# Patient Record
Sex: FEMALE | Race: WHITE | NOT HISPANIC OR LATINO | Employment: UNEMPLOYED | URBAN - METROPOLITAN AREA
[De-identification: names, ages, dates, MRNs, and addresses within clinical notes are randomized per-mention and may not be internally consistent; named-entity substitution may affect disease eponyms.]

---
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
## 2019-01-08 ENCOUNTER — POSTPARTUM VISIT (OUTPATIENT)
Dept: OBSTETRICS AND GYNECOLOGY | Facility: CLINIC | Age: 19
End: 2019-01-08

## 2019-01-08 VITALS — SYSTOLIC BLOOD PRESSURE: 122 MMHG | BODY MASS INDEX: 26.26 KG/M2 | WEIGHT: 130 LBS | DIASTOLIC BLOOD PRESSURE: 60 MMHG

## 2019-01-08 PROCEDURE — 99213 OFFICE O/P EST LOW 20 MIN: CPT | Performed by: OBSTETRICS & GYNECOLOGY

## 2019-01-08 RX ORDER — NORGESTIMATE AND ETHINYL ESTRADIOL 0.25-0.035
1 KIT ORAL DAILY
Qty: 1 PACKAGE | Refills: 11 | Status: SHIPPED | OUTPATIENT
Start: 2019-01-08 | End: 2019-02-05

## 2019-01-08 NOTE — PROGRESS NOTES
Cindy Guerin is a 18 y.o. patient who presents for follow up of   Chief Complaint   Patient presents with   • Postpartum Care     2 week       HPI  8 days postop status post  for twins.  Patient reports no fevers.  Appropriate abdominal pain.  Wants birth control.    The following portions of the patient's history were reviewed and updated as appropriate: allergies, current medications and problem list.    Review of Systems   Constitutional: Negative for appetite change, fever and unexpected weight change.   HENT: Negative for congestion and sore throat.    Respiratory: Negative for cough and shortness of breath.    Cardiovascular: Negative for chest pain and palpitations.   Gastrointestinal: Negative for abdominal distention, abdominal pain, constipation, diarrhea, nausea and vomiting.   Endocrine: Negative.    Genitourinary: Negative for dyspareunia, menstrual problem, pelvic pain and vaginal discharge.   Skin: Negative.    Neurological: Negative for dizziness and syncope.   Hematological: Negative.    Psychiatric/Behavioral: Negative for dysphoric mood and sleep disturbance. The patient is not nervous/anxious.        /60   Wt 59 kg (130 lb)   LMP  (LMP Unknown)   Breastfeeding? No   BMI 26.26 kg/m²     Physical Exam   Constitutional: She is oriented to person, place, and time. She appears well-developed and well-nourished.   HENT:   Head: Normocephalic and atraumatic.   Pulmonary/Chest: Effort normal. No respiratory distress.   Abdominal: Soft. She exhibits no distension and no mass. There is no tenderness. There is no rebound and no guarding.   Musculoskeletal: Normal range of motion.   Neurological: She is alert and oriented to person, place, and time.   Skin: Skin is warm and dry.   Psychiatric: She has a normal mood and affect. Her behavior is normal. Judgment and thought content normal.   Nursing note and vitals reviewed.        Assessment/Plan    Cindy was seen today for  postpartum care.    Diagnoses and all orders for this visit:    Postpartum care and examination    Other orders  -     norgestimate-ethinyl estradiol (ORTHO-CYCLEN) 0.25-35 MG-MCG per tablet; Take 1 tablet by mouth Daily.    Wants Nexplanon, start OCPs until that comes in.    Return in about 2 weeks (around 1/22/2019) for Nexplanon insertion.      Jarred Mcdaniel MD  1/8/2019  5:36 PM

## 2019-01-22 ENCOUNTER — PROCEDURE VISIT (OUTPATIENT)
Dept: OBSTETRICS AND GYNECOLOGY | Facility: CLINIC | Age: 19
End: 2019-01-22

## 2019-01-22 VITALS
WEIGHT: 129 LBS | SYSTOLIC BLOOD PRESSURE: 120 MMHG | BODY MASS INDEX: 26 KG/M2 | HEIGHT: 59 IN | DIASTOLIC BLOOD PRESSURE: 80 MMHG

## 2019-01-22 DIAGNOSIS — B18.1 CHRONIC VIRAL HEPATITIS B WITHOUT DELTA AGENT AND WITHOUT COMA (HCC): ICD-10-CM

## 2019-01-22 DIAGNOSIS — B18.2 CHRONIC HEPATITIS C WITHOUT HEPATIC COMA (HCC): ICD-10-CM

## 2019-01-22 DIAGNOSIS — K43.9 HERNIA OF ABDOMINAL WALL: ICD-10-CM

## 2019-01-22 DIAGNOSIS — F17.200 SMOKER: ICD-10-CM

## 2019-01-22 DIAGNOSIS — Z30.017 NEXPLANON INSERTION: Primary | ICD-10-CM

## 2019-01-22 PROBLEM — B95.1 GROUP B STREPTOCOCCUS URINARY TRACT INFECTION AFFECTING PREGNANCY, ANTEPARTUM: Status: RESOLVED | Noted: 2018-12-20 | Resolved: 2019-01-22

## 2019-01-22 PROBLEM — O24.419 GESTATIONAL DIABETES MELLITUS (GDM) AFFECTING PREGNANCY: Status: RESOLVED | Noted: 2018-11-07 | Resolved: 2019-01-22

## 2019-01-22 PROBLEM — O09.892 SHORT INTERVAL BETWEEN PREGNANCIES AFFECTING PREGNANCY IN SECOND TRIMESTER, ANTEPARTUM: Status: RESOLVED | Noted: 2018-10-01 | Resolved: 2019-01-22

## 2019-01-22 PROBLEM — O23.40 GROUP B STREPTOCOCCUS URINARY TRACT INFECTION AFFECTING PREGNANCY, ANTEPARTUM: Status: RESOLVED | Noted: 2018-12-20 | Resolved: 2019-01-22

## 2019-01-22 PROBLEM — R94.31 ABNORMAL EKG: Status: RESOLVED | Noted: 2018-06-12 | Resolved: 2019-01-22

## 2019-01-22 PROBLEM — O30.043 DICHORIONIC DIAMNIOTIC TWIN PREGNANCY IN THIRD TRIMESTER: Status: RESOLVED | Noted: 2018-08-14 | Resolved: 2019-01-22

## 2019-01-22 PROCEDURE — 11981 INSERTION DRUG DLVR IMPLANT: CPT | Performed by: OBSTETRICS & GYNECOLOGY

## 2019-01-22 NOTE — PROGRESS NOTES
Nexplanon Insertion:  Chief Complaint   Patient presents with   • Contraception     nexplanon insertion   • Procedure       Pre Dx: 1) Nexplanon Insertion   Post Dx: 1) Nexplanon Insertion     Procedures    Risks, benefits, alternatives explained. All questions answered. Consents signed.  Patient placed on examined table. Nexplanon to be inserted into nondominant arm. The area for insertion prepped with betadine in a sterile fashion. About 1 mL of 1% lidocaine was infiltrated without incident.    The insertion site was identified approximately 6-8 cm proximal to the elbow crease in the underside of the upper left arm overlying the groove between the biceps and triceps muscles. The skin at the insertion site was stretched by my thumb and index finger. Then inserted the needle tip, bevel side up, at an angle not greater than 20° to the skin surface, just until the skin was penetrated. The applicator was then lowered so that it was parallel to the arm. To minimize the chance of deep incision, the skin was tented upwards with the tip of the needle. The needle was then gently inserted to the full length. Then fixed the device in place on the arm with one hand. I then slowly and carefully retracted the needle cannula back along the length of the device after pushing the release button. The obturator was seen in the device to determine that the nexplanon had been released.     Both the patient and I were easily able to feel the device under the skin. Steri-Strips were applied at the site, and the arm was gently wrapped with gauze.    There were no complications.   The patient tolerated the procedure well.     She was given a reminder card. She was advised to use condoms as a backup method for at least a week after insertion.    Expectations, warning signs and limitations reviewed.     Cindy was seen today for contraception and procedure.    Diagnoses and all orders for this visit:    Smoker    Hernia of abdominal wall  -      Ambulatory Referral to General Surgery    Chronic viral hepatitis B    Chronic hepatitis C without hepatic coma (CMS/HCC)    Nexplanon insertion    surgery consult    RTO Return if symptoms worsen or fail to improve.      Gentry Craig MD    1/22/2019  3:37 PM

## 2019-02-05 ENCOUNTER — POSTPARTUM VISIT (OUTPATIENT)
Dept: OBSTETRICS AND GYNECOLOGY | Facility: CLINIC | Age: 19
End: 2019-02-05

## 2019-02-05 VITALS
DIASTOLIC BLOOD PRESSURE: 70 MMHG | WEIGHT: 127 LBS | BODY MASS INDEX: 26.66 KG/M2 | SYSTOLIC BLOOD PRESSURE: 110 MMHG | HEIGHT: 58 IN

## 2019-02-05 DIAGNOSIS — K43.9 HERNIA OF ABDOMINAL WALL: ICD-10-CM

## 2019-02-05 DIAGNOSIS — B18.1 CHRONIC VIRAL HEPATITIS B WITHOUT DELTA AGENT AND WITHOUT COMA (HCC): ICD-10-CM

## 2019-02-05 DIAGNOSIS — Z13.9 SCREENING FOR CONDITION: Primary | ICD-10-CM

## 2019-02-05 LAB
B-HCG UR QL: NEGATIVE
BILIRUB BLD-MCNC: NEGATIVE MG/DL
CLARITY, POC: CLEAR
COLOR UR: YELLOW
GLUCOSE UR STRIP-MCNC: NEGATIVE MG/DL
INTERNAL NEGATIVE CONTROL: NEGATIVE
INTERNAL POSITIVE CONTROL: POSITIVE
KETONES UR QL: NEGATIVE
LEUKOCYTE EST, POC: NEGATIVE
Lab: 2154
NITRITE UR-MCNC: NEGATIVE MG/ML
PH UR: 5 [PH] (ref 5–8)
PROT UR STRIP-MCNC: NEGATIVE MG/DL
RBC # UR STRIP: NEGATIVE /UL
SP GR UR: 1 (ref 1–1.03)
UROBILINOGEN UR QL: NORMAL

## 2019-02-05 PROCEDURE — 81025 URINE PREGNANCY TEST: CPT | Performed by: OBSTETRICS & GYNECOLOGY

## 2019-02-05 PROCEDURE — 99213 OFFICE O/P EST LOW 20 MIN: CPT | Performed by: OBSTETRICS & GYNECOLOGY

## 2019-02-05 RX ORDER — LEVETIRACETAM 500 MG/1
2000 TABLET ORAL 2 TIMES DAILY
COMMUNITY
Start: 2019-01-22 | End: 2019-04-22 | Stop reason: SDUPTHER

## 2019-02-05 RX ORDER — ETONOGESTREL 68 MG/1
1 IMPLANT SUBCUTANEOUS
COMMUNITY
Start: 2019-01-28 | End: 2021-06-11

## 2019-02-05 RX ORDER — AMOXICILLIN AND CLAVULANATE POTASSIUM 875; 125 MG/1; MG/1
TABLET, FILM COATED ORAL
COMMUNITY
Start: 2019-01-25 | End: 2019-02-20

## 2019-02-06 ENCOUNTER — OFFICE VISIT (OUTPATIENT)
Dept: SURGERY | Facility: CLINIC | Age: 19
End: 2019-02-06

## 2019-02-06 VITALS
WEIGHT: 130 LBS | DIASTOLIC BLOOD PRESSURE: 60 MMHG | HEIGHT: 58 IN | BODY MASS INDEX: 27.29 KG/M2 | RESPIRATION RATE: 18 BRPM | SYSTOLIC BLOOD PRESSURE: 118 MMHG

## 2019-02-06 DIAGNOSIS — M79.89 SOFT TISSUE MASS: Primary | ICD-10-CM

## 2019-02-06 PROCEDURE — 99213 OFFICE O/P EST LOW 20 MIN: CPT | Performed by: SURGERY

## 2019-02-06 NOTE — PROGRESS NOTES
"Cindy Guerin is a 18 y.o. patient who presents for follow up of   Chief Complaint   Patient presents with   • Postpartum Care       HPI 18-year-old 6 weeks status post delivery of twins at 35 weeks.  She is a chronic hepatitis B carrier and needs follow-up.  She does not report any vaginal bleeding at this time.  She has had a subdermal implant for birth control already placed.  She denies any postpartum blues and the babies are doing well at home.  She has surgery scheduled on her left ventral wall hernia scheduled for next week.  She wants to have a note to be cleared for work.  She is currently seeking employment.    The following portions of the patient's history were reviewed and updated as appropriate: allergies, current medications and problem list.    Review of Systems   Constitutional: Negative for appetite change, fever and unexpected weight change.   HENT: Negative for congestion and sore throat.    Respiratory: Negative for cough and shortness of breath.    Cardiovascular: Negative for chest pain and palpitations.   Gastrointestinal: Negative for abdominal distention, abdominal pain, constipation, diarrhea, nausea and vomiting.   Endocrine: Negative.    Genitourinary: Negative for dyspareunia, menstrual problem, pelvic pain and vaginal discharge.   Skin: Negative.    Neurological: Negative for dizziness and syncope.   Hematological: Negative.    Psychiatric/Behavioral: Negative for dysphoric mood and sleep disturbance. The patient is not nervous/anxious.        /70   Ht 147.3 cm (58\")   Wt 57.6 kg (127 lb)   LMP  (LMP Unknown)   Breastfeeding? No   BMI 26.54 kg/m²     Physical Exam   Constitutional: She is oriented to person, place, and time. She appears well-developed and well-nourished.   HENT:   Head: Normocephalic and atraumatic.   Pulmonary/Chest: Effort normal. No respiratory distress.   Abdominal: Soft. She exhibits no distension (left rectus hernia) and no mass. There is no " tenderness. There is no rebound and no guarding.   Musculoskeletal: Normal range of motion.   Neurological: She is alert and oriented to person, place, and time.   Skin: Skin is warm and dry.   Psychiatric: She has a normal mood and affect. Her behavior is normal. Judgment and thought content normal.   Nursing note and vitals reviewed.        Assessment/Plan    Cindy was seen today for postpartum care.    Diagnoses and all orders for this visit:    Screening for condition  -     POC Urinalysis Dipstick  -     POC Pregnancy, Urine    Postpartum care and examination    Hernia of abdominal wall    Chronic viral hepatitis B    No Pap indicated since 18 years old.  Has surgery planned for ventral hernia.  Has subdermal implant for birth control.  Follow up one year for annual physical.    Return in about 1 year (around 2/5/2020) for Annual physical.      Jarred Mcdaniel MD  2/6/2019  9:06 AM

## 2019-02-06 NOTE — PROGRESS NOTES
PATIENT INFORMATION  Cindy Guerin       - 2000    CHIEF COMPLAINT  Chief Complaint   Patient presents with   • Hernia   hernia x 1 month on the left associated with pain    HISTORY OF PRESENT ILLNESS  HPI she complains of a one month history of a knot of her left abdominal wall that she says is painful and has increased in size.  She denies any GI complaints.  She is not having her periods.  She had a recent .        REVIEW OF SYSTEMS  Review of Systems   Gastrointestinal: Positive for constipation.         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]   • Drug abuse during pregnancy (CMS/HCC) [O99.320, F19.10]   • Chronic hepatitis C without hepatic coma (CMS/HCC) [B18.2]   • Chronic viral hepatitis B [B18.1]         PAST MEDICAL HISTORY  Past Medical History:   Diagnosis Date   • Anemia    • Gallstones     sched lap elyssa   • Hepatitis B    • Hepatitis C    • History of heart murmur in childhood    • History of kidney stones    • HSV-1 infection 2017   • Seizures (CMS/HCC)     last 18, also had one on 18, was seen at [facility]; has been referred to neuro by PCP         SURGICAL HISTORY  Past Surgical History:   Procedure Laterality Date   • ADENOIDECTOMY     •  SECTION      Twins 36 weeks   • TONSILLECTOMY     • WISDOM TOOTH EXTRACTION           FAMILY HISTORY  Family History   Problem Relation Age of Onset   • Cervical cancer Mother    • Cervical cancer Maternal Grandmother    • Diabetes Maternal Grandmother    • Brain cancer Other    • Diabetes Other    • Heart failure Other    • Diabetes Other    • Heart  "failure Other    • Other Sister         SAB   • Breast cancer Neg Hx    • Ovarian cancer Neg Hx    • Colon cancer Neg Hx          SOCIAL HISTORY  Social History     Occupational History     Employer: UNEMPLOYED   Tobacco Use   • Smoking status: Current Every Day Smoker     Packs/day: 0.50     Years: 10.00     Pack years: 5.00     Types: Cigarettes   • Smokeless tobacco: Never Used   Substance and Sexual Activity   • Alcohol use: No   • Drug use: Yes     Types: Methamphetamines     Comment: history of drug use- last use 8 monthsago   • Sexual activity: Yes     Partners: Male     Birth control/protection: None, Implant       Debilities/Disabilities Identified: None    Emotional Behavior: Appropriate    CURRENT MEDICATIONS    Current Outpatient Medications:   •  amoxicillin-clavulanate (AUGMENTIN) 875-125 MG per tablet, , Disp: , Rfl:   •  levETIRAcetam (KEPPRA) 500 MG tablet, , Disp: , Rfl:   •  NEXPLANON 68 MG implant subdermal implant, , Disp: , Rfl:     ALLERGIES  Sulfa antibiotics    VITALS  Vitals:    02/06/19 1403   BP: 118/60   Resp: 18   Weight: 59 kg (130 lb)   Height: 147.3 cm (58\")       LAST RESULTS   Postpartum Visit on 02/05/2019   Component Date Value Ref Range Status   • Color 02/05/2019 Yellow  Yellow, Straw, Dark Yellow, Gregoria Final   • Clarity, UA 02/05/2019 Clear  Clear Final   • Glucose, UA 02/05/2019 Negative  Negative, 1000 mg/dL (3+) mg/dL Final   • Bilirubin 02/05/2019 Negative  Negative Final   • Ketones, UA 02/05/2019 Negative  Negative Final   • Specific Gravity  02/05/2019 1.005  1.005 - 1.030 Final   • Blood, UA 02/05/2019 Negative  Negative Final   • pH, Urine 02/05/2019 5.0  5.0 - 8.0 Final   • Protein, POC 02/05/2019 Negative  Negative mg/dL Final   • Urobilinogen, UA 02/05/2019 Normal  Normal Final   • Leukocytes 02/05/2019 Negative  Negative Final   • Nitrite, UA 02/05/2019 Negative  Negative Final   • HCG, Urine, QL 02/05/2019 Negative  Negative Final   • Lot Number 02/05/2019 [id number] "   Final   • Internal Positive Control 02/05/2019 Positive   Final   • Internal Negative Control 02/05/2019 Negative   Final     No results found.    PHYSICAL EXAM  Physical Exam's alert white female in no active distress.  She points to a nodule in her left lower quadrant.  There is no cellulitis or erythema.  There is no evidence of abscess.  There is a small thickening in her soft tissue in this area but I was unable to appreciate a discrete mass nor an impulse.  We will check a CT scan of the abdomen to rule out a spighellian hernia  She also has known cholelithiasis on ultrasound showed a gallstone.  She apparently was going to have surgery with another surgeon but that got deferred  ASSESSMENT  Abdominal wall mass      PLAN  See Her back after CT scan of the abdomen is performed to rule out a spighellian hernia

## 2019-02-11 ENCOUNTER — HOSPITAL ENCOUNTER (OUTPATIENT)
Dept: CT IMAGING | Facility: HOSPITAL | Age: 19
Discharge: HOME | End: 2019-02-11
Admitting: SURGERY

## 2019-02-11 DIAGNOSIS — M79.89 SOFT TISSUE MASS: ICD-10-CM

## 2019-02-11 PROCEDURE — 74176 CT ABD & PELVIS W/O CONTRAST: CPT

## 2019-02-13 ENCOUNTER — OFFICE VISIT (OUTPATIENT)
Dept: SURGERY | Facility: CLINIC | Age: 19
End: 2019-02-13

## 2019-02-13 VITALS
SYSTOLIC BLOOD PRESSURE: 120 MMHG | DIASTOLIC BLOOD PRESSURE: 64 MMHG | HEIGHT: 58 IN | BODY MASS INDEX: 27.92 KG/M2 | RESPIRATION RATE: 18 BRPM | WEIGHT: 133 LBS

## 2019-02-13 DIAGNOSIS — K43.9 VENTRAL HERNIA WITHOUT OBSTRUCTION OR GANGRENE: Primary | ICD-10-CM

## 2019-02-13 PROCEDURE — 99213 OFFICE O/P EST LOW 20 MIN: CPT | Performed by: SURGERY

## 2019-02-13 NOTE — PROGRESS NOTES
PATIENT INFORMATION  Cindy Guerin       - 2000    CHIEF COMPLAINT  Chief Complaint   Patient presents with   • Abdominal Pain   abdominal pain has increased since last visit. Ct completed 19 - at times she has nausea and vomiting    HISTORY OF PRESENT ILLNESS  HPI she is here today for follow-up on her CT scan.  She still complains of ongoing pain in the left lower quadrant.  He does have some nausea and periumbilical pain as well.        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]   • Drug abuse during pregnancy (CMS/HCC) [O99.320, F19.10]   • Chronic hepatitis C without hepatic coma (CMS/HCC) [B18.2]   • Chronic viral hepatitis B [B18.1]         PAST MEDICAL HISTORY  Past Medical History:   Diagnosis Date   • Anemia    • Gallstones     sched lap elyssa   • Hepatitis B    • Hepatitis C    • History of heart murmur in childhood    • History of kidney stones    • HSV-1 infection 2017   • Seizures (CMS/HCC)     last 18, also had one on 18, was seen at [facility]; has been referred to neuro by PCP         SURGICAL HISTORY  Past Surgical History:   Procedure Laterality Date   • ADENOIDECTOMY     •  SECTION      Twins 36 weeks   • TONSILLECTOMY     • WISDOM TOOTH EXTRACTION           FAMILY HISTORY  Family History   Problem Relation Age of Onset   • Cervical cancer Mother    • Cervical cancer Maternal Grandmother    • Diabetes Maternal Grandmother    • Brain cancer Other    • Diabetes Other    • Heart failure Other    • Diabetes Other    • Heart failure Other   "  • Other Sister         SAB   • Breast cancer Neg Hx    • Ovarian cancer Neg Hx    • Colon cancer Neg Hx          SOCIAL HISTORY  Social History     Occupational History     Employer: UNEMPLOYED   Tobacco Use   • Smoking status: Current Every Day Smoker     Packs/day: 0.50     Years: 10.00     Pack years: 5.00     Types: Cigarettes   • Smokeless tobacco: Never Used   Substance and Sexual Activity   • Alcohol use: No   • Drug use: Yes     Types: Methamphetamines     Comment: history of drug use- last use 8 monthsago   • Sexual activity: Yes     Partners: Male     Birth control/protection: None, Implant       Debilities/Disabilities Identified: None    Emotional Behavior: Appropriate    CURRENT MEDICATIONS    Current Outpatient Medications:   •  amoxicillin-clavulanate (AUGMENTIN) 875-125 MG per tablet, , Disp: , Rfl:   •  levETIRAcetam (KEPPRA) 500 MG tablet, , Disp: , Rfl:   •  NEXPLANON 68 MG implant subdermal implant, , Disp: , Rfl:     ALLERGIES  Sulfa antibiotics    VITALS  Vitals:    02/13/19 1441   BP: 120/64   Resp: 18   Weight: 60.3 kg (133 lb)   Height: 147.3 cm (58\")       LAST RESULTS   Postpartum Visit on 02/05/2019   Component Date Value Ref Range Status   • Color 02/05/2019 Yellow  Yellow, Straw, Dark Yellow, Gregoria Final   • Clarity, UA 02/05/2019 Clear  Clear Final   • Glucose, UA 02/05/2019 Negative  Negative, 1000 mg/dL (3+) mg/dL Final   • Bilirubin 02/05/2019 Negative  Negative Final   • Ketones, UA 02/05/2019 Negative  Negative Final   • Specific Gravity  02/05/2019 1.005  1.005 - 1.030 Final   • Blood, UA 02/05/2019 Negative  Negative Final   • pH, Urine 02/05/2019 5.0  5.0 - 8.0 Final   • Protein, POC 02/05/2019 Negative  Negative mg/dL Final   • Urobilinogen, UA 02/05/2019 Normal  Normal Final   • Leukocytes 02/05/2019 Negative  Negative Final   • Nitrite, UA 02/05/2019 Negative  Negative Final   • HCG, Urine, QL 02/05/2019 Negative  Negative Final   • Lot Number 02/05/2019 [id number]   Final   • " Internal Positive Control 02/05/2019 Positive   Final   • Internal Negative Control 02/05/2019 Negative   Final     Ct Abdomen Pelvis Without Contrast    Addendum Date: 2/11/2019 Addendum:   In the left lower quadrant of the anterior abdominal wall located just below the area of marked dilated abnormality and just above the left inguinal region, there is a 2 cm x 1 cm circumscribed focus of fat tissue which could reflect a small hernia. Clinical correlation is recommended. Findings discussed with Dr. Alvarado at 2:45 PM on 2/11/2019.  This report was finalized on 2/11/2019 2:49 PM by Dr. Anish Watson MD.      Result Date: 2/11/2019  Narrative: CT SCAN OF THE ABDOMEN AND PELVIS WITHOUT CONTRAST 2/11/2019  HISTORY: Pain in mid abdomen with constipation since December 2018  TECHNIQUE: Spiral CT was performed through the abdomen and pelvis without oral or intravenous contrast administration as per clinician request. Radiation dose reduction techniques included automated exposure control or exposure modulation based on body size. Radiation audit for CT and nuclear cardiology exams in the last 12 months: 0.  ABDOMEN FINDINGS: Examination is somewhat limited by the lack of oral and intravenous contrast. The liver, spleen, pancreas, gallbladder and biliary tree and adrenal glands are normal. There are nonobstructing renal stones bilaterally.  PELVIS FINDINGS: The gut, mesenteric and jose structures are normal. The appendix is normal. There is a small perifocal hernia containing only fat. There is no free fluid in the abdomen or pelvis. Lung bases are normal.      Impression: 1. Nonobstructing renal stones bilaterally. 2. Small periumbilical hernia containing only fat.  This report was finalized on 2/11/2019 2:00 PM by Dr. Anish Watson MD.        PHYSICAL EXAM  Physical Exam alert white female in no active distress.  Her heart shows a regular rate and rhythm her lungs are clear and equal.  Her gait is normal.  She has a  reducible periumbilical hernia.  She has had a mass in the left lower quadrant.  CT scan of the abdomen was reviewed by me and does show cholelithiasis and a periumbilical hernia.  It also shows what appears to be a hernia sac in the left lower quadrant at the site of her concern.    ASSESSMENT  Ventral hernia x2, cholelithiasis      PLAN  I discussed the risk benefits and options with her mother in detail.  I would like to stage these procedures.  I would like to proceed with exploration of her left lower quadrant abdominal wall first because that is what she is primarily symptomatic from.  This may require a mesh repair so I would like to fall off on proceeding with a gallbladder surgery until later date.  Discussed risks of surgery with patient and in particular increased risk of wound infection, poor wound healing, hernias (with abdominal surgery) and post-operative pulmonary complications associated with smoking.

## 2019-02-13 NOTE — H&P
PATIENT INFORMATION  Cindy Guerin       - 2000    CHIEF COMPLAINT  Chief Complaint   Patient presents with   • Abdominal Pain   abdominal pain has increased since last visit. Ct completed 19 - at times she has nausea and vomiting    HISTORY OF PRESENT ILLNESS  HPI she is here today for follow-up on her CT scan.  She still complains of ongoing pain in the left lower quadrant.  He does have some nausea and periumbilical pain as well.        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]   • Drug abuse during pregnancy (CMS/HCC) [O99.320, F19.10]   • Chronic hepatitis C without hepatic coma (CMS/HCC) [B18.2]   • Chronic viral hepatitis B [B18.1]         PAST MEDICAL HISTORY  Past Medical History:   Diagnosis Date   • Anemia    • Gallstones     sched lap elyssa   • Hepatitis B    • Hepatitis C    • History of heart murmur in childhood    • History of kidney stones    • HSV-1 infection 2017   • Seizures (CMS/HCC)     last 18, also had one on 18, was seen at [facility]; has been referred to neuro by PCP         SURGICAL HISTORY  Past Surgical History:   Procedure Laterality Date   • ADENOIDECTOMY     •  SECTION      Twins 36 weeks   • TONSILLECTOMY     • WISDOM TOOTH EXTRACTION           FAMILY HISTORY  Family History   Problem Relation Age of Onset   • Cervical cancer Mother    • Cervical cancer Maternal Grandmother    • Diabetes Maternal Grandmother    • Brain cancer Other    • Diabetes Other    • Heart failure Other    • Diabetes Other    • Heart failure Other   "  • Other Sister         SAB   • Breast cancer Neg Hx    • Ovarian cancer Neg Hx    • Colon cancer Neg Hx          SOCIAL HISTORY  Social History     Occupational History     Employer: UNEMPLOYED   Tobacco Use   • Smoking status: Current Every Day Smoker     Packs/day: 0.50     Years: 10.00     Pack years: 5.00     Types: Cigarettes   • Smokeless tobacco: Never Used   Substance and Sexual Activity   • Alcohol use: No   • Drug use: Yes     Types: Methamphetamines     Comment: history of drug use- last use 8 monthsago   • Sexual activity: Yes     Partners: Male     Birth control/protection: None, Implant       Debilities/Disabilities Identified: None    Emotional Behavior: Appropriate    CURRENT MEDICATIONS    Current Outpatient Medications:   •  amoxicillin-clavulanate (AUGMENTIN) 875-125 MG per tablet, , Disp: , Rfl:   •  levETIRAcetam (KEPPRA) 500 MG tablet, , Disp: , Rfl:   •  NEXPLANON 68 MG implant subdermal implant, , Disp: , Rfl:     ALLERGIES  Sulfa antibiotics    VITALS  Vitals:    02/13/19 1441   BP: 120/64   Resp: 18   Weight: 60.3 kg (133 lb)   Height: 147.3 cm (58\")       LAST RESULTS   Postpartum Visit on 02/05/2019   Component Date Value Ref Range Status   • Color 02/05/2019 Yellow  Yellow, Straw, Dark Yellow, Gregoria Final   • Clarity, UA 02/05/2019 Clear  Clear Final   • Glucose, UA 02/05/2019 Negative  Negative, 1000 mg/dL (3+) mg/dL Final   • Bilirubin 02/05/2019 Negative  Negative Final   • Ketones, UA 02/05/2019 Negative  Negative Final   • Specific Gravity  02/05/2019 1.005  1.005 - 1.030 Final   • Blood, UA 02/05/2019 Negative  Negative Final   • pH, Urine 02/05/2019 5.0  5.0 - 8.0 Final   • Protein, POC 02/05/2019 Negative  Negative mg/dL Final   • Urobilinogen, UA 02/05/2019 Normal  Normal Final   • Leukocytes 02/05/2019 Negative  Negative Final   • Nitrite, UA 02/05/2019 Negative  Negative Final   • HCG, Urine, QL 02/05/2019 Negative  Negative Final   • Lot Number 02/05/2019 [id number]   Final   • " Internal Positive Control 02/05/2019 Positive   Final   • Internal Negative Control 02/05/2019 Negative   Final     Ct Abdomen Pelvis Without Contrast    Addendum Date: 2/11/2019 Addendum:   In the left lower quadrant of the anterior abdominal wall located just below the area of marked dilated abnormality and just above the left inguinal region, there is a 2 cm x 1 cm circumscribed focus of fat tissue which could reflect a small hernia. Clinical correlation is recommended. Findings discussed with Dr. Alvarado at 2:45 PM on 2/11/2019.  This report was finalized on 2/11/2019 2:49 PM by Dr. Anish Watson MD.      Result Date: 2/11/2019  Narrative: CT SCAN OF THE ABDOMEN AND PELVIS WITHOUT CONTRAST 2/11/2019  HISTORY: Pain in mid abdomen with constipation since December 2018  TECHNIQUE: Spiral CT was performed through the abdomen and pelvis without oral or intravenous contrast administration as per clinician request. Radiation dose reduction techniques included automated exposure control or exposure modulation based on body size. Radiation audit for CT and nuclear cardiology exams in the last 12 months: 0.  ABDOMEN FINDINGS: Examination is somewhat limited by the lack of oral and intravenous contrast. The liver, spleen, pancreas, gallbladder and biliary tree and adrenal glands are normal. There are nonobstructing renal stones bilaterally.  PELVIS FINDINGS: The gut, mesenteric and jose structures are normal. The appendix is normal. There is a small perifocal hernia containing only fat. There is no free fluid in the abdomen or pelvis. Lung bases are normal.      Impression: 1. Nonobstructing renal stones bilaterally. 2. Small periumbilical hernia containing only fat.  This report was finalized on 2/11/2019 2:00 PM by Dr. Anish Watson MD.        PHYSICAL EXAM  Physical Exam alert white female in no active distress.  Her heart shows a regular rate and rhythm her lungs are clear and equal.  Her gait is normal.  She has a  reducible periumbilical hernia.  She has had a mass in the left lower quadrant.  CT scan of the abdomen was reviewed by me and does show cholelithiasis and a periumbilical hernia.  It also shows what appears to be a hernia sac in the left lower quadrant at the site of her concern.    ASSESSMENT  Ventral hernia x2, cholelithiasis      PLAN  I discussed the risk benefits and options with her mother in detail.  I would like to stage these procedures.  I would like to proceed with exploration of her left lower quadrant abdominal wall first because that is what she is primarily symptomatic from.  This may require a mesh repair so I would like to fall off on proceeding with a gallbladder surgery until later date.  Discussed risks of surgery with patient and in particular increased risk of wound infection, poor wound healing, hernias (with abdominal surgery) and post-operative pulmonary complications associated with smoking.

## 2019-02-14 ENCOUNTER — TELEPHONE (OUTPATIENT)
Dept: SURGERY | Facility: CLINIC | Age: 19
End: 2019-02-14

## 2019-02-14 ENCOUNTER — ANESTHESIA EVENT (OUTPATIENT)
Dept: PERIOP | Facility: HOSPITAL | Age: 19
End: 2019-02-14

## 2019-02-14 ENCOUNTER — HOSPITAL ENCOUNTER (OUTPATIENT)
Facility: HOSPITAL | Age: 19
Setting detail: HOSPITAL OUTPATIENT SURGERY
End: 2019-02-14
Attending: SURGERY | Admitting: SURGERY

## 2019-02-14 DIAGNOSIS — Z01.818 PREOPERATIVE CLEARANCE: Primary | ICD-10-CM

## 2019-02-14 PROBLEM — K43.9 VENTRAL HERNIA WITHOUT OBSTRUCTION OR GANGRENE: Status: ACTIVE | Noted: 2019-02-14

## 2019-02-14 NOTE — PAT
Chart reviewed by MD SETH; stated pt needs to have echo and f/u with Dr Berry as previously ordered in June of 2018 prior to surgery.  Notified  at Dr Alvarado's office.

## 2019-02-14 NOTE — TELEPHONE ENCOUNTER
Anesthesia is requesting cardiac clearance prior to her hernia repair scheduled on 2/22/19. Patient has scheduled her echo on 2/21/19 @ 2:00. Did you need to see her back in the office for clearance? Would it even be possible to have the results back from the echo prior to her scheduled surgery?

## 2019-02-14 NOTE — TELEPHONE ENCOUNTER
Patient is aware to contact one call to schedule her Echo. I will send a message to her cardiologist for clearance after this is done.

## 2019-02-15 NOTE — TELEPHONE ENCOUNTER
Pt will need to have stress test and follow up with Dr Berry.     We scheduled stress test on same day as Echo and office visit on 2/26/19 at 12:30.  Stress Test at 1:00p  Echo 2:00p

## 2019-02-15 NOTE — TELEPHONE ENCOUNTER
Surgery has been canceled on 2/22/19. Patient is aware to call me back to schedule surgery after clearance. I also advised her to keep her appointment with the neurologist.

## 2019-02-20 ENCOUNTER — HOSPITAL ENCOUNTER (EMERGENCY)
Facility: HOSPITAL | Age: 19
Discharge: HOME | End: 2019-02-20
Attending: EMERGENCY MEDICINE | Admitting: EMERGENCY MEDICINE

## 2019-02-20 ENCOUNTER — TELEPHONE (OUTPATIENT)
Dept: SURGERY | Facility: CLINIC | Age: 19
End: 2019-02-20

## 2019-02-20 VITALS
TEMPERATURE: 98.2 F | BODY MASS INDEX: 26.81 KG/M2 | OXYGEN SATURATION: 99 % | HEART RATE: 70 BPM | HEIGHT: 59 IN | DIASTOLIC BLOOD PRESSURE: 66 MMHG | WEIGHT: 133 LBS | RESPIRATION RATE: 16 BRPM | SYSTOLIC BLOOD PRESSURE: 101 MMHG

## 2019-02-20 DIAGNOSIS — N30.01 ACUTE CYSTITIS WITH HEMATURIA: ICD-10-CM

## 2019-02-20 DIAGNOSIS — K29.00 ACUTE GASTRITIS WITHOUT HEMORRHAGE, UNSPECIFIED GASTRITIS TYPE: Primary | ICD-10-CM

## 2019-02-20 LAB
ALBUMIN SERPL-MCNC: 4.2 G/DL (ref 3.5–5.2)
ALBUMIN/GLOB SERPL: 1.4 G/DL
ALP SERPL-CCNC: 81 U/L (ref 40–129)
ALT SERPL W P-5'-P-CCNC: 39 U/L (ref 5–33)
ANION GAP SERPL CALCULATED.3IONS-SCNC: 11.2 MMOL/L
AST SERPL-CCNC: 30 U/L (ref 5–32)
BACTERIA UR QL AUTO: ABNORMAL /HPF
BASOPHILS # BLD AUTO: 0.04 10*3/MM3 (ref 0–0.2)
BASOPHILS NFR BLD AUTO: 0.4 % (ref 0–1.5)
BILIRUB SERPL-MCNC: 0.2 MG/DL (ref 0.2–1.2)
BILIRUB UR QL STRIP: NEGATIVE
BUN BLD-MCNC: 7 MG/DL (ref 6–20)
BUN/CREAT SERPL: 12.5 (ref 7–25)
CALCIUM SPEC-SCNC: 9.3 MG/DL (ref 8.6–10.5)
CHLORIDE SERPL-SCNC: 105 MMOL/L (ref 98–107)
CLARITY UR: ABNORMAL
CO2 SERPL-SCNC: 23.8 MMOL/L (ref 22–29)
COLOR UR: YELLOW
CREAT BLD-MCNC: 0.56 MG/DL (ref 0.57–1)
DEPRECATED RDW RBC AUTO: 50.4 FL (ref 37–54)
EOSINOPHIL # BLD AUTO: 0.44 10*3/MM3 (ref 0–0.4)
EOSINOPHIL NFR BLD AUTO: 4.8 % (ref 0.3–6.2)
ERYTHROCYTE [DISTWIDTH] IN BLOOD BY AUTOMATED COUNT: 15.5 % (ref 12.3–15.4)
GFR SERPL CREATININE-BSD FRML MDRD: 139 ML/MIN/1.73
GLOBULIN UR ELPH-MCNC: 3.1 GM/DL
GLUCOSE BLD-MCNC: 85 MG/DL (ref 65–99)
GLUCOSE UR STRIP-MCNC: NEGATIVE MG/DL
HCT VFR BLD AUTO: 41.2 % (ref 34–46.6)
HGB BLD-MCNC: 13.2 G/DL (ref 12–15.9)
HGB UR QL STRIP.AUTO: ABNORMAL
HYALINE CASTS UR QL AUTO: ABNORMAL /LPF
IMM GRANULOCYTES # BLD AUTO: 0.03 10*3/MM3 (ref 0–0.05)
IMM GRANULOCYTES NFR BLD AUTO: 0.3 % (ref 0–0.5)
KETONES UR QL STRIP: NEGATIVE
LEUKOCYTE ESTERASE UR QL STRIP.AUTO: NEGATIVE
LIPASE SERPL-CCNC: 97 U/L (ref 13–60)
LYMPHOCYTES # BLD AUTO: 4.03 10*3/MM3 (ref 0.7–3.1)
LYMPHOCYTES NFR BLD AUTO: 43.9 % (ref 19.6–45.3)
MCH RBC QN AUTO: 29.1 PG (ref 26.6–33)
MCHC RBC AUTO-ENTMCNC: 32 G/DL (ref 31.5–35.7)
MCV RBC AUTO: 90.9 FL (ref 79–97)
MONOCYTES # BLD AUTO: 0.8 10*3/MM3 (ref 0.1–0.9)
MONOCYTES NFR BLD AUTO: 8.7 % (ref 5–12)
NEUTROPHILS # BLD AUTO: 3.83 10*3/MM3 (ref 1.4–7)
NEUTROPHILS NFR BLD AUTO: 41.9 % (ref 42.7–76)
NITRITE UR QL STRIP: NEGATIVE
NRBC BLD AUTO-RTO: 0 /100 WBC (ref 0–0)
PH UR STRIP.AUTO: 7 [PH] (ref 4.5–8)
PLATELET # BLD AUTO: 320 10*3/MM3 (ref 140–450)
PMV BLD AUTO: 10.2 FL (ref 6–12)
POTASSIUM BLD-SCNC: 4 MMOL/L (ref 3.5–5.2)
PROT SERPL-MCNC: 7.3 G/DL (ref 6–8.5)
PROT UR QL STRIP: NEGATIVE
RBC # BLD AUTO: 4.53 10*6/MM3 (ref 3.77–5.28)
RBC # UR: ABNORMAL /HPF
REF LAB TEST METHOD: ABNORMAL
SODIUM BLD-SCNC: 140 MMOL/L (ref 136–145)
SP GR UR STRIP: 1.01 (ref 1–1.03)
SQUAMOUS #/AREA URNS HPF: ABNORMAL /HPF
UROBILINOGEN UR QL STRIP: ABNORMAL
WBC NRBC COR # BLD: 9.17 10*3/MM3 (ref 3.4–10.8)
WBC UR QL AUTO: ABNORMAL /HPF

## 2019-02-20 PROCEDURE — 85025 COMPLETE CBC W/AUTO DIFF WBC: CPT | Performed by: PHYSICIAN ASSISTANT

## 2019-02-20 PROCEDURE — 80053 COMPREHEN METABOLIC PANEL: CPT | Performed by: PHYSICIAN ASSISTANT

## 2019-02-20 PROCEDURE — 99284 EMERGENCY DEPT VISIT MOD MDM: CPT

## 2019-02-20 PROCEDURE — 81001 URINALYSIS AUTO W/SCOPE: CPT | Performed by: PHYSICIAN ASSISTANT

## 2019-02-20 PROCEDURE — 83690 ASSAY OF LIPASE: CPT | Performed by: PHYSICIAN ASSISTANT

## 2019-02-20 PROCEDURE — 99284 EMERGENCY DEPT VISIT MOD MDM: CPT | Performed by: PHYSICIAN ASSISTANT

## 2019-02-20 RX ORDER — OXCARBAZEPINE 300 MG/1
300 TABLET, FILM COATED ORAL 2 TIMES DAILY
COMMUNITY
End: 2019-04-21

## 2019-02-20 RX ORDER — ALUMINA, MAGNESIA, AND SIMETHICONE 2400; 2400; 240 MG/30ML; MG/30ML; MG/30ML
15 SUSPENSION ORAL ONCE
Status: COMPLETED | OUTPATIENT
Start: 2019-02-20 | End: 2019-02-20

## 2019-02-20 RX ORDER — ONDANSETRON 2 MG/ML
4 INJECTION INTRAMUSCULAR; INTRAVENOUS ONCE
Status: DISCONTINUED | OUTPATIENT
Start: 2019-02-20 | End: 2019-02-20 | Stop reason: HOSPADM

## 2019-02-20 RX ORDER — CEFUROXIME AXETIL 250 MG/1
250 TABLET ORAL 2 TIMES DAILY
Qty: 14 TABLET | Refills: 0 | Status: SHIPPED | OUTPATIENT
Start: 2019-02-20 | End: 2019-02-27

## 2019-02-20 RX ORDER — PANTOPRAZOLE SODIUM 40 MG/1
40 TABLET, DELAYED RELEASE ORAL DAILY
Qty: 30 TABLET | Refills: 0 | Status: SHIPPED | OUTPATIENT
Start: 2019-02-20 | End: 2019-04-05

## 2019-02-20 RX ORDER — SUCRALFATE ORAL 1 G/10ML
1 SUSPENSION ORAL
Qty: 280 ML | Refills: 0 | Status: SHIPPED | OUTPATIENT
Start: 2019-02-20 | End: 2019-02-27

## 2019-02-20 RX ORDER — SODIUM CHLORIDE 0.9 % (FLUSH) 0.9 %
10 SYRINGE (ML) INJECTION AS NEEDED
Status: DISCONTINUED | OUTPATIENT
Start: 2019-02-20 | End: 2019-02-20 | Stop reason: HOSPADM

## 2019-02-20 RX ADMIN — ALUMINUM HYDROXIDE, MAGNESIUM HYDROXIDE, AND DIMETHICONE 15 ML: 400; 400; 40 SUSPENSION ORAL at 14:44

## 2019-02-20 RX ADMIN — LIDOCAINE HYDROCHLORIDE 15 ML: 20 SOLUTION ORAL; TOPICAL at 14:44

## 2019-02-20 NOTE — ED PROVIDER NOTES
Subjective   History of Present Illness  History of Present Illness    Chief complaint: abd pain    Location: [facility]    Quality/Severity: Sharp, severe    Timing/Duration: 2 days    Modifying Factors: Worse after eating and drinking and movement.  Nothing makes better.    Associated Symptoms: Positive nausea and vomiting.  Denies fevers or chills.  Denies chest pain or shortness of breath.    Narrative: 18-year-old female presents with abdominal pain as above.  She recently saw Dr. Alvarado on 2/13.  She is scheduled for an exploratory surgery of her left lower abdominal wall and possible hernia repair in 2 days from now. She states this is a new pain. Denies pregnancy, has nexplanon in. Not breast feeding.     Ct Abdomen Pelvis Without Contrast    Addendum Date: 2/11/2019 Addendum:   In the left lower quadrant of the anterior abdominal wall located just below the area of marked dilated abnormality and just above the left inguinal region, there is a 2 cm x 1 cm circumscribed focus of fat tissue which could reflect a small hernia. Clinical correlation is recommended. Findings discussed with Dr. Alvarado at 2:45 PM on 2/11/2019.  This report was finalized on 2/11/2019 2:49 PM by Dr. Anish Watson MD.      Result Date: 2/11/2019  Narrative: CT SCAN OF THE ABDOMEN AND PELVIS WITHOUT CONTRAST 2/11/2019  HISTORY: Pain in mid abdomen with constipation since December 2018  TECHNIQUE: Spiral CT was performed through the abdomen and pelvis without oral or intravenous contrast administration as per clinician request. Radiation dose reduction techniques included automated exposure control or exposure modulation based on body size. Radiation audit for CT and nuclear cardiology exams in the last 12 months: 0.  ABDOMEN FINDINGS: Examination is somewhat limited by the lack of oral and intravenous contrast. The liver, spleen, pancreas, gallbladder and biliary tree and adrenal glands are normal. There are nonobstructing renal stones  bilaterally.  PELVIS FINDINGS: The gut, mesenteric and jose structures are normal. The appendix is normal. There is a small perifocal hernia containing only fat. There is no free fluid in the abdomen or pelvis. Lung bases are normal.      Impression: 1. Nonobstructing renal stones bilaterally. 2. Small periumbilical hernia containing only fat.  This report was finalized on 2019 2:00 PM by Dr. Anish Watson MD.        Review of Systems   Constitutional: Negative.    Respiratory: Negative.    Cardiovascular: Negative.    Gastrointestinal: Positive for abdominal pain, nausea and vomiting.   Genitourinary: Positive for frequency. Negative for pelvic pain and vaginal discharge.   Musculoskeletal: Negative.    Skin: Negative.    Neurological: Negative.    Hematological: Negative.    All other systems reviewed and are negative.      Past Medical History:   Diagnosis Date   • Anemia    • Gallstones     sched lap elyssa   • Hepatitis B    • Hepatitis C    • History of heart murmur in childhood    • History of kidney stones    • HSV-1 infection 2017   • Seizures (CMS/HCC)     last 18, also had one on 18, was seen at [facility]; has been referred to neuro by PCP       Allergies   Allergen Reactions   • Sulfa Antibiotics Hives       Past Surgical History:   Procedure Laterality Date   • ADENOIDECTOMY     •  SECTION      Twins 36 weeks   • TONSILLECTOMY     • WISDOM TOOTH EXTRACTION         Family History   Problem Relation Age of Onset   • Cervical cancer Mother    • Cervical cancer Maternal Grandmother    • Diabetes Maternal Grandmother    • Brain cancer Other    • Diabetes Other    • Heart failure Other    • Diabetes Other    • Heart failure Other    • Other Sister         SAB   • Breast cancer Neg Hx    • Ovarian cancer Neg Hx    • Colon cancer Neg Hx        Social History     Socioeconomic History   • Marital status: Single     Spouse name: Not on file   • Number of children: Not on file    • Years of education: Not on file   • Highest education level: Not on file   Occupational History     Employer: UNEMPLOYED   Tobacco Use   • Smoking status: Current Every Day Smoker     Packs/day: 0.50     Years: 10.00     Pack years: 5.00     Types: Cigarettes   • Smokeless tobacco: Never Used   Substance and Sexual Activity   • Alcohol use: No   • Drug use: Yes     Types: Methamphetamines     Comment: history of drug use- last use 8 monthsago   • Sexual activity: Yes     Partners: Male     Birth control/protection: None, Implant   Social History Narrative    ** Merged History Encounter **          No current facility-administered medications for this encounter.     Current Outpatient Medications:   •  levETIRAcetam (KEPPRA) 500 MG tablet, , Disp: , Rfl:   •  OXcarbazepine (TRILEPTAL) 300 MG tablet, Take 300 mg by mouth 2 (Two) Times a Day., Disp: , Rfl:   •  amoxicillin-clavulanate (AUGMENTIN) 875-125 MG per tablet, , Disp: , Rfl:   •  NEXPLANON 68 MG implant subdermal implant, , Disp: , Rfl:         Objective   Physical Exam  Vitals:    02/20/19 1400   BP: 114/84   Pulse: 71   Resp: 16   Temp: 98.2 °F (36.8 °C)   SpO2: 99%     GENERAL: Alert and oriented ×4.  No apparent distress.  SKIN: Warm, pink and dry  HEENT: Atraumatic normocephalic  LUNGS: Clear to auscultation bilaterally without wheezes, rales or rhonchi  CARDIAC: Regular rate and rhythm.  S1 and S2.  No murmurs, rubs or gallops.  ABD: Soft, nontender, nondistended.  Bowel sounds are present normoactive.  No guarding or rebound tenderness.  Left ventral hernia is reducible. No cva tenderness.  M/S: MAEW, no deformity  PSYCH: Normal mood and affect    Procedures           ED Course      Results for orders placed or performed during the hospital encounter of 02/20/19   Comprehensive Metabolic Panel   Result Value Ref Range    Glucose 85 65 - 99 mg/dL    BUN 7 6 - 20 mg/dL    Creatinine 0.56 (L) 0.57 - 1.00 mg/dL    Sodium 140 136 - 145 mmol/L     Potassium 4.0 3.5 - 5.2 mmol/L    Chloride 105 98 - 107 mmol/L    CO2 23.8 22.0 - 29.0 mmol/L    Calcium 9.3 8.6 - 10.5 mg/dL    Total Protein 7.3 6.0 - 8.5 g/dL    Albumin 4.20 3.50 - 5.20 g/dL    ALT (SGPT) 39 (H) 5 - 33 U/L    AST (SGOT) 30 5 - 32 U/L    Alkaline Phosphatase 81 40 - 129 U/L    Total Bilirubin 0.2 0.2 - 1.2 mg/dL    eGFR Non African Amer 139 >60 mL/min/1.73    Globulin 3.1 gm/dL    A/G Ratio 1.4 g/dL    BUN/Creatinine Ratio 12.5 7.0 - 25.0    Anion Gap 11.2 mmol/L   Lipase   Result Value Ref Range    Lipase 97 (H) 13 - 60 U/L   CBC Auto Differential   Result Value Ref Range    WBC 9.17 3.40 - 10.80 10*3/mm3    RBC 4.53 3.77 - 5.28 10*6/mm3    Hemoglobin 13.2 12.0 - 15.9 g/dL    Hematocrit 41.2 34.0 - 46.6 %    MCV 90.9 79.0 - 97.0 fL    MCH 29.1 26.6 - 33.0 pg    MCHC 32.0 31.5 - 35.7 g/dL    RDW 15.5 (H) 12.3 - 15.4 %    RDW-SD 50.4 37.0 - 54.0 fl    MPV 10.2 6.0 - 12.0 fL    Platelets 320 140 - 450 10*3/mm3    Neutrophil % 41.9 (L) 42.7 - 76.0 %    Lymphocyte % 43.9 19.6 - 45.3 %    Monocyte % 8.7 5.0 - 12.0 %    Eosinophil % 4.8 0.3 - 6.2 %    Basophil % 0.4 0.0 - 1.5 %    Immature Grans % 0.3 0.0 - 0.5 %    Neutrophils, Absolute 3.83 1.40 - 7.00 10*3/mm3    Lymphocytes, Absolute 4.03 (H) 0.70 - 3.10 10*3/mm3    Monocytes, Absolute 0.80 0.10 - 0.90 10*3/mm3    Eosinophils, Absolute 0.44 (H) 0.00 - 0.40 10*3/mm3    Basophils, Absolute 0.04 0.00 - 0.20 10*3/mm3    Immature Grans, Absolute 0.03 0.00 - 0.05 10*3/mm3    nRBC 0.0 0.0 - 0.0 /100 WBC   Urinalysis With Microscopic If Indicated (No Culture) - Urine, Clean Catch   Result Value Ref Range    Color, UA Yellow Yellow, Straw    Appearance, UA Slightly Cloudy (A) Clear    pH, UA 7.0 4.5 - 8.0    Specific Gravity, UA 1.015 1.003 - 1.030    Glucose, UA Negative Negative    Ketones, UA Negative Negative    Bilirubin, UA Negative Negative    Blood, UA Moderate (2+) (A) Negative    Protein, UA Negative Negative    Leuk Esterase, UA Negative Negative     Nitrite, UA Negative Negative    Urobilinogen, UA 0.2 E.U./dL 0.2 - 1.0 E.U./dL   Urinalysis, Microscopic Only - Urine, Clean Catch   Result Value Ref Range    RBC, UA 6-12 (A) None Seen /HPF    WBC, UA 3-5 (A) None Seen /HPF    Bacteria, UA Trace (A) None Seen /HPF    Squamous Epithelial Cells, UA 3-6 (A) None Seen, 0-2 /HPF    Hyaline Casts, UA None Seen None Seen /LPF    Methodology Manual Light Microscopy        [id number].  Discharged with Protonix, Carafate and Ceftin.  Patient to follow-up with Dr. Alvarado as previously scheduled.  She states that she has plenty of nausea medicine at home.  Abdominal exam is benign.  No indication for CT today.  Recent CT earlier this month.    Discussed pertinent labs and imaging findings with the patient/family.  Patient/Family voiced understanding of need to follow-up for recheck, further testing as needed.  Return to the emergency Department warnings were given.              MDM  Number of Diagnoses or Management Options  Acute cystitis with hematuria: new and requires workup  Acute gastritis without hemorrhage, unspecified gastritis type: new and requires workup     Amount and/or Complexity of Data Reviewed  Clinical lab tests: reviewed and ordered  Tests in the radiology section of CPT®: reviewed  Tests in the medicine section of CPT®: reviewed and ordered  Decide to obtain previous medical records or to obtain history from someone other than the patient: yes  Obtain history from someone other than the patient: yes  Review and summarize past medical records: yes    Risk of Complications, Morbidity, and/or Mortality  Presenting problems: moderate  Diagnostic procedures: moderate  Management options: moderate    Patient Progress  Patient progress: improved    My differential diagnosis for abdominal pain includes but is not limited to:  Gastritis, gastroenteritis, peptic ulcer disease, GERD, esophageal perforation, acute appendicitis, mesenteric adenitis, Meckel’s  diverticulum, epiploic appendagitis, diverticulitis, colon cancer, ulcerative colitis, Crohn’s disease, intussusception, small bowel obstruction, adhesions, ischemic bowel, perforated viscus, ileus, obstipation, biliary colic, cholecystitis, cholelithiasis, Marbin-Lex Ismael, hepatitis, pancreatitis, common bile duct obstruction, cholangitis, bile leak, splenic trauma, splenic rupture, splenic infarction, splenic abscess, abdominal abscess, ascites, spontaneous bacterial peritonitis, hernia, UTI, cystitis,ureterolithiasis, urinary obstruction, ovarian cyst, torsion, pregnancy, ectopic pregnancy, PID, pelvic abscess, mittelschmerz, endometriosis, AAA, myocardial infarction, pneumonia, cancer, porphyria, DKA, medications, sickle cell, viral syndrome, zoster      Final diagnoses:   Acute gastritis without hemorrhage, unspecified gastritis type   Acute cystitis with hematuria       Dictated utilizing Dragon dictation       Ambika Sullivan PA-C  02/20/19 3446

## 2019-02-20 NOTE — TELEPHONE ENCOUNTER
Spoke to Dr. Alvarado and he recommends to go to the ER.    I called the patient and told her to come to the ER

## 2019-02-22 ENCOUNTER — ANESTHESIA (OUTPATIENT)
Dept: PERIOP | Facility: HOSPITAL | Age: 19
End: 2019-02-22

## 2019-03-11 ENCOUNTER — OFFICE VISIT (OUTPATIENT)
Dept: OBSTETRICS AND GYNECOLOGY | Facility: CLINIC | Age: 19
End: 2019-03-11

## 2019-03-11 ENCOUNTER — PROCEDURE VISIT (OUTPATIENT)
Dept: OBSTETRICS AND GYNECOLOGY | Facility: CLINIC | Age: 19
End: 2019-03-11

## 2019-03-11 VITALS
WEIGHT: 138 LBS | SYSTOLIC BLOOD PRESSURE: 104 MMHG | BODY MASS INDEX: 27.82 KG/M2 | DIASTOLIC BLOOD PRESSURE: 72 MMHG | HEIGHT: 59 IN

## 2019-03-11 DIAGNOSIS — Z97.5 BREAKTHROUGH BLEEDING ON NEXPLANON: ICD-10-CM

## 2019-03-11 DIAGNOSIS — N92.1 BREAKTHROUGH BLEEDING ON NEXPLANON: ICD-10-CM

## 2019-03-11 DIAGNOSIS — R10.2 PELVIC PAIN: Primary | ICD-10-CM

## 2019-03-11 PROBLEM — Z80.41 FAMILY HISTORY OF OVARIAN CANCER: Status: ACTIVE | Noted: 2019-03-11

## 2019-03-11 PROCEDURE — 76830 TRANSVAGINAL US NON-OB: CPT | Performed by: NURSE PRACTITIONER

## 2019-03-11 PROCEDURE — 99213 OFFICE O/P EST LOW 20 MIN: CPT | Performed by: NURSE PRACTITIONER

## 2019-03-11 RX ORDER — PYRAZINAMIDE 500 MG/1
2 TABLET ORAL EVERY 4 HOURS PRN
Qty: 30 TABLET | Refills: 0 | Status: SHIPPED | OUTPATIENT
Start: 2019-03-11 | End: 2019-04-05

## 2019-03-11 NOTE — PROGRESS NOTES
"Subjective     Chief Complaint   Patient presents with   • Menstrual Problem       Cindy Guerin is a 18 y.o.  whose LMP is No LMP recorded.. She presents with complaints of pelvic pain and irregular bleeding with the Nexplanon. She had primary  d/t twins in December. She had nexplanon placement 18. States she has been bleeding daily until yesterday. Up until this point, has been bleeding daily. She reports she is filling a pad a hour. She is worried her bleeding is going to restart. She is sexually active with the same partner. She reports pain in her lower abdomen. Pain is worse on the (L) lower abdomen.     Her mother has a history a ovarian cancer. She is uncertain the age of diagnosis. She has not had genetic testing.       HPI    HPI    The following portions of the patient's history were reviewed and updated as appropriate:vital signs, allergies, current medications, past medical history, past social history, past surgical history and problem list      Review of Systems     Review of Systems   Constitutional: Negative.    Gastrointestinal: Positive for abdominal pain.   Genitourinary: Positive for dyspareunia and vaginal bleeding.   Psychiatric/Behavioral: Negative.        Objective      /72   Ht 149.9 cm (59.02\")   Wt 62.6 kg (138 lb)   Breastfeeding? No   BMI 27.86 kg/m²     Physical Exam    Physical Exam   Constitutional: She is oriented to person, place, and time. She appears well-developed and well-nourished.   Abdominal: Soft. Bowel sounds are normal. Hernia confirmed negative in the right inguinal area and confirmed negative in the left inguinal area.   Genitourinary: Rectum normal. Pelvic exam was performed with patient supine. There is no rash, tenderness, lesion or injury on the right labia. There is no rash, tenderness, lesion or injury on the left labia. Uterus is not deviated, not enlarged, not fixed and not tender. Cervix exhibits no motion tenderness, no " discharge and no friability. Right adnexum displays no mass, no tenderness and no fullness. Left adnexum displays no mass, no tenderness and no fullness. No erythema, tenderness or bleeding in the vagina. No foreign body in the vagina. No signs of injury around the vagina. No vaginal discharge found.   Lymphadenopathy:        Right: No inguinal adenopathy present.        Left: No inguinal adenopathy present.   Neurological: She is alert and oriented to person, place, and time.   Skin: Skin is warm and dry.   Psychiatric: She has a normal mood and affect. Her behavior is normal.   Vitals reviewed.      Lab Review   Labs: No data reviewed     Imaging   Ultrasound - Pelvic Vaginal. US IMP: The uterus is anteverted and appears normal in shape and contour. The EL measures 0.2cm. The right ovary appears normal in size and shape.  There is a simple cyst with a single septation seen within the left ovary. The cyst measures 2.1 cm x1.9cm.     Assessment  There are no diagnoses linked to this encounter.    Additional Assessment:   1) Irregular bleeding on Nexplanon  2) Pelvic pain     Plan     1. Irregular bleeding on Nexplanon- Stopped yesterday. Disc with patient that bleeding may restart and may persist for the duration of the bars time of use. Disc options of progesterone tablets daily for 2-3 months.     2. Scheduled for: STD Labs - Nu Swab - Myco, leuk, bv, and yeast  , Ultrasound of the -  N/A , Mammography - N/A , Bone Density Test - N/A , Additional Labs - N/A    3. Pap:  At age 20      4. Contraception: Nexplanon    5. STD:  Enc condom use.     6.   BMI Status: Patient's Body mass index is 27.86 kg/m². BMI is above normal parameters. Recommendations include: educational material.    RTO TORO Guillen, SAMANTHA  3/11/2019

## 2019-03-14 ENCOUNTER — HOSPITAL ENCOUNTER (OUTPATIENT)
Dept: CARDIOLOGY | Facility: HOSPITAL | Age: 19
Discharge: HOME | End: 2019-03-14
Admitting: INTERNAL MEDICINE

## 2019-03-14 ENCOUNTER — HOSPITAL ENCOUNTER (OUTPATIENT)
Dept: CARDIOLOGY | Facility: HOSPITAL | Age: 19
Discharge: HOME | End: 2019-03-14

## 2019-03-14 VITALS — BODY MASS INDEX: 27.82 KG/M2 | WEIGHT: 138 LBS | HEIGHT: 59 IN

## 2019-03-14 DIAGNOSIS — Z01.818 PREOPERATIVE CLEARANCE: ICD-10-CM

## 2019-03-14 LAB
BH CV STRESS BP STAGE 1: NORMAL
BH CV STRESS BP STAGE 2: NORMAL
BH CV STRESS BP STAGE 3: NORMAL
BH CV STRESS DURATION MIN STAGE 1: 3
BH CV STRESS DURATION MIN STAGE 2: 3
BH CV STRESS DURATION MIN STAGE 3: 0
BH CV STRESS DURATION SEC STAGE 1: 0
BH CV STRESS DURATION SEC STAGE 2: 0
BH CV STRESS DURATION SEC STAGE 3: 50
BH CV STRESS GRADE STAGE 1: 10
BH CV STRESS GRADE STAGE 2: 12
BH CV STRESS GRADE STAGE 3: 14
BH CV STRESS HR STAGE 1: 113
BH CV STRESS HR STAGE 2: 135
BH CV STRESS HR STAGE 3: 141
BH CV STRESS METS STAGE 1: 5
BH CV STRESS METS STAGE 2: 7.5
BH CV STRESS METS STAGE 3: 10
BH CV STRESS PROTOCOL 1: NORMAL
BH CV STRESS RECOVERY BP: NORMAL MMHG
BH CV STRESS RECOVERY HR: 94 BPM
BH CV STRESS SPEED STAGE 1: 1.7
BH CV STRESS SPEED STAGE 2: 2.5
BH CV STRESS SPEED STAGE 3: 3.4
BH CV STRESS STAGE 1: 1
BH CV STRESS STAGE 2: 2
BH CV STRESS STAGE 3: 3
MAXIMAL PREDICTED HEART RATE: 201 BPM
PERCENT MAX PREDICTED HR: 74.63 %
STRESS BASELINE BP: NORMAL MMHG
STRESS BASELINE HR: 90 BPM
STRESS O2 SAT REST: 100 %
STRESS PERCENT HR: 88 %
STRESS POST ESTIMATED WORKLOAD: 8.3 METS
STRESS POST EXERCISE DUR MIN: 6 MIN
STRESS POST EXERCISE DUR SEC: 50 SEC
STRESS POST O2 SAT PEAK: 100 %
STRESS POST PEAK BP: NORMAL MMHG
STRESS POST PEAK HR: 150 BPM
STRESS TARGET HR: 171 BPM

## 2019-03-14 PROCEDURE — 93017 CV STRESS TEST TRACING ONLY: CPT

## 2019-03-14 PROCEDURE — 93016 CV STRESS TEST SUPVJ ONLY: CPT | Performed by: INTERNAL MEDICINE

## 2019-03-14 PROCEDURE — 93306 TTE W/DOPPLER COMPLETE: CPT | Performed by: INTERNAL MEDICINE

## 2019-03-14 PROCEDURE — 93306 TTE W/DOPPLER COMPLETE: CPT

## 2019-03-14 PROCEDURE — 93018 CV STRESS TEST I&R ONLY: CPT | Performed by: INTERNAL MEDICINE

## 2019-03-15 LAB
A VAGINAE DNA VAG QL NAA+PROBE: NORMAL SCORE
BH CV ECHO MEAS - ACS: 1.9 CM
BH CV ECHO MEAS - AO MAX PG (FULL): 2.7 MMHG
BH CV ECHO MEAS - AO MAX PG: 7.7 MMHG
BH CV ECHO MEAS - AO MEAN PG (FULL): 1 MMHG
BH CV ECHO MEAS - AO MEAN PG: 4 MMHG
BH CV ECHO MEAS - AO ROOT AREA (BSA CORRECTED): 1.7
BH CV ECHO MEAS - AO ROOT AREA: 5.5 CM^2
BH CV ECHO MEAS - AO ROOT DIAM: 2.7 CM
BH CV ECHO MEAS - AO V2 MAX: 139 CM/SEC
BH CV ECHO MEAS - AO V2 MEAN: 90.2 CM/SEC
BH CV ECHO MEAS - AO V2 VTI: 28.8 CM
BH CV ECHO MEAS - AVA(I,A): 3.1 CM^2
BH CV ECHO MEAS - AVA(I,D): 3.1 CM^2
BH CV ECHO MEAS - AVA(V,A): 2.8 CM^2
BH CV ECHO MEAS - AVA(V,D): 2.8 CM^2
BH CV ECHO MEAS - BSA(HAYCOCK): 1.6 M^2
BH CV ECHO MEAS - BSA: 1.6 M^2
BH CV ECHO MEAS - BZI_BMI: 27.9 KILOGRAMS/M^2
BH CV ECHO MEAS - BZI_METRIC_HEIGHT: 149.9 CM
BH CV ECHO MEAS - BZI_METRIC_WEIGHT: 62.6 KG
BH CV ECHO MEAS - EDV(CUBED): 109.9 ML
BH CV ECHO MEAS - EDV(MOD-SP2): 74.4 ML
BH CV ECHO MEAS - EDV(MOD-SP4): 73.2 ML
BH CV ECHO MEAS - EDV(TEICH): 107 ML
BH CV ECHO MEAS - EF(CUBED): 74.4 %
BH CV ECHO MEAS - EF(MOD-BP): 56 %
BH CV ECHO MEAS - EF(MOD-SP2): 53.5 %
BH CV ECHO MEAS - EF(MOD-SP4): 57.7 %
BH CV ECHO MEAS - EF(TEICH): 66.2 %
BH CV ECHO MEAS - ESV(CUBED): 28.1 ML
BH CV ECHO MEAS - ESV(MOD-SP2): 34.6 ML
BH CV ECHO MEAS - ESV(MOD-SP4): 31 ML
BH CV ECHO MEAS - ESV(TEICH): 36.2 ML
BH CV ECHO MEAS - FS: 36.5 %
BH CV ECHO MEAS - IVS/LVPW: 1.1
BH CV ECHO MEAS - IVSD: 0.77 CM
BH CV ECHO MEAS - LA DIMENSION: 2.8 CM
BH CV ECHO MEAS - LA/AO: 1.1
BH CV ECHO MEAS - LAT PEAK E' VEL: 14 CM/SEC
BH CV ECHO MEAS - LV DIASTOLIC VOL/BSA (35-75): 46.5 ML/M^2
BH CV ECHO MEAS - LV MASS(C)D: 111.4 GRAMS
BH CV ECHO MEAS - LV MASS(C)DI: 70.7 GRAMS/M^2
BH CV ECHO MEAS - LV MAX PG: 5 MMHG
BH CV ECHO MEAS - LV MEAN PG: 3 MMHG
BH CV ECHO MEAS - LV SYSTOLIC VOL/BSA (12-30): 19.7 ML/M^2
BH CV ECHO MEAS - LV V1 MAX: 112 CM/SEC
BH CV ECHO MEAS - LV V1 MEAN: 80.7 CM/SEC
BH CV ECHO MEAS - LV V1 VTI: 25.8 CM
BH CV ECHO MEAS - LVIDD: 4.8 CM
BH CV ECHO MEAS - LVIDS: 3 CM
BH CV ECHO MEAS - LVLD AP2: 7.8 CM
BH CV ECHO MEAS - LVLD AP4: 7.4 CM
BH CV ECHO MEAS - LVLS AP2: 6.3 CM
BH CV ECHO MEAS - LVLS AP4: 5.8 CM
BH CV ECHO MEAS - LVOT AREA (M): 3.5 CM^2
BH CV ECHO MEAS - LVOT AREA: 3.5 CM^2
BH CV ECHO MEAS - LVOT DIAM: 2.1 CM
BH CV ECHO MEAS - LVPWD: 0.69 CM
BH CV ECHO MEAS - MED PEAK E' VEL: 9 CM/SEC
BH CV ECHO MEAS - MV A DUR: 0.12 SEC
BH CV ECHO MEAS - MV A MAX VEL: 63 CM/SEC
BH CV ECHO MEAS - MV DEC SLOPE: 582 CM/SEC^2
BH CV ECHO MEAS - MV DEC TIME: 0.18 SEC
BH CV ECHO MEAS - MV E MAX VEL: 83.9 CM/SEC
BH CV ECHO MEAS - MV E/A: 1.3
BH CV ECHO MEAS - MV MAX PG: 5.7 MMHG
BH CV ECHO MEAS - MV MEAN PG: 2 MMHG
BH CV ECHO MEAS - MV P1/2T MAX VEL: 118 CM/SEC
BH CV ECHO MEAS - MV P1/2T: 59.4 MSEC
BH CV ECHO MEAS - MV V2 MAX: 119 CM/SEC
BH CV ECHO MEAS - MV V2 MEAN: 65.1 CM/SEC
BH CV ECHO MEAS - MV V2 VTI: 27.9 CM
BH CV ECHO MEAS - MVA P1/2T LCG: 1.9 CM^2
BH CV ECHO MEAS - MVA(P1/2T): 3.7 CM^2
BH CV ECHO MEAS - MVA(VTI): 3.2 CM^2
BH CV ECHO MEAS - PA ACC TIME: 0.13 SEC
BH CV ECHO MEAS - PA MAX PG (FULL): 1.6 MMHG
BH CV ECHO MEAS - PA MAX PG: 4.8 MMHG
BH CV ECHO MEAS - PA PR(ACCEL): 20.5 MMHG
BH CV ECHO MEAS - PA V2 MAX: 110 CM/SEC
BH CV ECHO MEAS - PULM A REVS DUR: 0.11 SEC
BH CV ECHO MEAS - PULM A REVS VEL: 21.3 CM/SEC
BH CV ECHO MEAS - PULM DIAS VEL: 21.3 CM/SEC
BH CV ECHO MEAS - PULM S/D: 1.3
BH CV ECHO MEAS - PULM SYS VEL: 26.7 CM/SEC
BH CV ECHO MEAS - PVA(V,A): 2.6 CM^2
BH CV ECHO MEAS - PVA(V,D): 2.6 CM^2
BH CV ECHO MEAS - QP/QS: 0.79
BH CV ECHO MEAS - RAP SYSTOLE: 3 MMHG
BH CV ECHO MEAS - RV MAX PG: 3.3 MMHG
BH CV ECHO MEAS - RV MEAN PG: 2 MMHG
BH CV ECHO MEAS - RV V1 MAX: 90.2 CM/SEC
BH CV ECHO MEAS - RV V1 MEAN: 58.3 CM/SEC
BH CV ECHO MEAS - RV V1 VTI: 22.5 CM
BH CV ECHO MEAS - RVOT AREA: 3.1 CM^2
BH CV ECHO MEAS - RVOT DIAM: 2 CM
BH CV ECHO MEAS - RVSP: 20 MMHG
BH CV ECHO MEAS - SI(AO): 100.9 ML/M^2
BH CV ECHO MEAS - SI(CUBED): 51.9 ML/M^2
BH CV ECHO MEAS - SI(LVOT): 56.7 ML/M^2
BH CV ECHO MEAS - SI(MOD-SP2): 25.3 ML/M^2
BH CV ECHO MEAS - SI(MOD-SP4): 26.8 ML/M^2
BH CV ECHO MEAS - SI(TEICH): 45 ML/M^2
BH CV ECHO MEAS - SV(AO): 158.8 ML
BH CV ECHO MEAS - SV(CUBED): 81.8 ML
BH CV ECHO MEAS - SV(LVOT): 89.4 ML
BH CV ECHO MEAS - SV(MOD-SP2): 39.8 ML
BH CV ECHO MEAS - SV(MOD-SP4): 42.2 ML
BH CV ECHO MEAS - SV(RVOT): 70.7 ML
BH CV ECHO MEAS - SV(TEICH): 70.8 ML
BH CV ECHO MEAS - TAPSE (>1.6): 1.6 CM2
BH CV ECHO MEAS - TR MAX VEL: 223 CM/SEC
BH CV ECHO MEASUREMENTS AVERAGE E/E' RATIO: 7.3
BH CV VAS BP RIGHT ARM: NORMAL MMHG
BH CV XLRA - RV BASE: 3.5 CM
BH CV XLRA - TDI S': 13 CM/SEC
BVAB2 DNA VAG QL NAA+PROBE: NORMAL SCORE
C ALBICANS DNA VAG QL NAA+PROBE: NEGATIVE
C GLABRATA DNA VAG QL NAA+PROBE: NEGATIVE
C TRACH RRNA SPEC QL NAA+PROBE: NEGATIVE
LABORATORY COMMENT REPORT: ABNORMAL
LEFT ATRIUM VOLUME INDEX: 19 ML/M2
M GENITALIUM DNA SPEC QL NAA+PROBE: NEGATIVE
M HOMINIS DNA SPEC QL NAA+PROBE: NEGATIVE
MAXIMAL PREDICTED HEART RATE: 201 BPM
MEGA1 DNA VAG QL NAA+PROBE: NORMAL SCORE
N GONORRHOEA RRNA SPEC QL NAA+PROBE: NEGATIVE
STRESS TARGET HR: 171 BPM
T VAGINALIS RRNA SPEC QL NAA+PROBE: NEGATIVE
UREAPLASMA DNA SPEC QL NAA+PROBE: POSITIVE

## 2019-03-18 ENCOUNTER — OFFICE VISIT (OUTPATIENT)
Dept: OBSTETRICS AND GYNECOLOGY | Facility: CLINIC | Age: 19
End: 2019-03-18

## 2019-03-18 VITALS
SYSTOLIC BLOOD PRESSURE: 104 MMHG | WEIGHT: 139.6 LBS | BODY MASS INDEX: 28.14 KG/M2 | HEIGHT: 59 IN | DIASTOLIC BLOOD PRESSURE: 76 MMHG

## 2019-03-18 DIAGNOSIS — N83.209 CYST OF OVARY, UNSPECIFIED LATERALITY: ICD-10-CM

## 2019-03-18 DIAGNOSIS — R10.2 PELVIC PAIN: Primary | ICD-10-CM

## 2019-03-18 DIAGNOSIS — Z13.9 SCREENING FOR CONDITION: ICD-10-CM

## 2019-03-18 DIAGNOSIS — N76.0 ACUTE VAGINITIS: ICD-10-CM

## 2019-03-18 DIAGNOSIS — Z80.41 FAMILY HISTORY OF OVARIAN CANCER: ICD-10-CM

## 2019-03-18 PROCEDURE — 99213 OFFICE O/P EST LOW 20 MIN: CPT | Performed by: NURSE PRACTITIONER

## 2019-03-18 RX ORDER — AZITHROMYCIN 250 MG/1
TABLET, FILM COATED ORAL
Qty: 6 TABLET | Refills: 0 | Status: SHIPPED | OUTPATIENT
Start: 2019-03-18 | End: 2019-04-05

## 2019-03-18 NOTE — PROGRESS NOTES
"Subjective     Chief Complaint   Patient presents with   • Vaginal Pain       Cindy Guerin is a 18 y.o.  whose LMP is No LMP recorded.. She presents with continued BTB on Nexplanon. She is unhappy with the irregular bleeding but is happy with the convenience of the contraception. She continues to have pelvic pain. She was seen on 3/11/19 with complaints of pelvic pain. The pain has not worsened but continues intermittently.  Her US indicated she had a small ovarian cyst. She was also diagnosed with ureaplasma. She reports that she never picked up the prescription to complete her medication. She recently completed [facility] genetic screening for cancer d/t a family h/o ovarian cancer.     HPI    HPI    The following portions of the patient's history were reviewed and updated as appropriate:vital signs, allergies, current medications, past medical history, past social history, past surgical history and problem list      Review of Systems     Review of Systems   Constitutional: Negative.    Gastrointestinal: Negative.    Genitourinary: Positive for pelvic pain and vaginal bleeding.   Psychiatric/Behavioral: Negative.        Objective      /76   Ht 149.9 cm (59.02\")   Wt 63.3 kg (139 lb 9.6 oz)   Breastfeeding? No   BMI 28.18 kg/m²     Physical Exam    Physical Exam   Constitutional: She is oriented to person, place, and time. She appears well-developed and well-nourished.   Abdominal: Soft. Bowel sounds are normal. Hernia confirmed negative in the right inguinal area and confirmed negative in the left inguinal area.   Genitourinary: Rectum normal. Pelvic exam was performed with patient supine. There is no rash, tenderness, lesion or injury on the right labia. There is no rash, tenderness, lesion or injury on the left labia. Uterus is not deviated, not enlarged, not fixed and not tender. Cervix exhibits no motion tenderness, no discharge and no friability. Right adnexum displays no mass, no " tenderness and no fullness. Left adnexum displays no mass, no tenderness and no fullness. No erythema, tenderness or bleeding in the vagina. No foreign body in the vagina. No signs of injury around the vagina. No vaginal discharge found.   Genitourinary Comments: Pt was not tender on exam      Lymphadenopathy:        Right: No inguinal adenopathy present.        Left: No inguinal adenopathy present.   Neurological: She is alert and oriented to person, place, and time.   Skin: Skin is warm and dry.   Psychiatric: She has a normal mood and affect. Her behavior is normal.   Vitals reviewed.      Lab Review   Labs: Urine pregnancy test     Imaging   Ultrasound - Pelvic Vaginal US IMP (3/11/19): The uterus is anteverted and appears normal in shape and contour. The EL measures 0.2cm. The right ovary appears normal in size and shape.  There is a simple cyst with a single septation seen within the left ovary. The cyst measures 2.1 cm x1.9cm.    Assessment  Cindy was seen today for vaginal pain.    Diagnoses and all orders for this visit:    Screening for condition  -     POC Pregnancy, Urine        Additional Assessment:   1) Ureaplamsa  2) Irregular bleeding on Nexplanon  3) Ovarian cyst   4) Family h/o ovarian cancer   Plan     1. Ureaplasma- Pt did not  rx. Instructed to get antibx and complete.     2. Irregular bleeding on Nexplanon- Disc use of aygestin or OCPs to help regulate cycles. Due to possible upcoming surgery(gallbladder), the patient did not want to take additional hormone. Will reeval after surgery.     3. Ovarian cyst- Possible cause of pain. Enc repeat US today. Will repeat US in 1 month. Rev warn s/s.     4. Scheduled for: STD Labs - N/A , Ultrasound of the -  PELVIC , Mammography - N/A , Bone Density Test - N/A , Additional Labs - N/A    5. Pap:  At age 20    6. Contraception: nexplanon    7. STD:  Enc condom use.     8. Smoking status: nonsmoker     9.   BMI Status: Patient's Body mass index is  28.18 kg/m². BMI is above normal parameters. Recommendations include: educational material.    10. Family h/o ovarian cancer- [facility] results= neg. Disc results with patient.     RTO PRN and 1 month     Dalia Guillen, APRN  3/18/2019

## 2019-03-19 ENCOUNTER — TELEPHONE (OUTPATIENT)
Dept: OBSTETRICS AND GYNECOLOGY | Facility: CLINIC | Age: 19
End: 2019-03-19

## 2019-03-20 ENCOUNTER — TELEPHONE (OUTPATIENT)
Dept: SURGERY | Facility: CLINIC | Age: 19
End: 2019-03-20

## 2019-03-28 PROBLEM — N76.0 ACUTE VAGINITIS: Status: ACTIVE | Noted: 2019-03-28

## 2019-03-28 PROBLEM — R10.2 PELVIC PAIN: Status: ACTIVE | Noted: 2019-03-28

## 2019-03-29 ENCOUNTER — TELEPHONE (OUTPATIENT)
Dept: OBSTETRICS AND GYNECOLOGY | Facility: CLINIC | Age: 19
End: 2019-03-29

## 2019-04-04 ENCOUNTER — LAB (OUTPATIENT)
Dept: LAB | Facility: HOSPITAL | Age: 19
End: 2019-04-04

## 2019-04-04 ENCOUNTER — TREATMENT (OUTPATIENT)
Dept: NEUROLOGY | Facility: CLINIC | Age: 19
End: 2019-04-04

## 2019-04-04 ENCOUNTER — TELEPHONE (OUTPATIENT)
Dept: NEUROLOGY | Facility: CLINIC | Age: 19
End: 2019-04-04

## 2019-04-04 ENCOUNTER — OFFICE VISIT (OUTPATIENT)
Dept: NEUROLOGY | Facility: CLINIC | Age: 19
End: 2019-04-04

## 2019-04-04 VITALS
OXYGEN SATURATION: 99 % | WEIGHT: 139.4 LBS | SYSTOLIC BLOOD PRESSURE: 110 MMHG | DIASTOLIC BLOOD PRESSURE: 68 MMHG | BODY MASS INDEX: 28.1 KG/M2 | HEIGHT: 59 IN | HEART RATE: 69 BPM

## 2019-04-04 DIAGNOSIS — G40.319 INTRACTABLE GENERALIZED IDIOPATHIC EPILEPSY WITHOUT STATUS EPILEPTICUS (HCC): ICD-10-CM

## 2019-04-04 DIAGNOSIS — G40.319 INTRACTABLE GENERALIZED IDIOPATHIC EPILEPSY WITHOUT STATUS EPILEPTICUS (HCC): Primary | ICD-10-CM

## 2019-04-04 PROBLEM — G40.909 SEIZURE DISORDER (HCC): Status: RESOLVED | Noted: 2018-10-01 | Resolved: 2019-04-04

## 2019-04-04 PROCEDURE — 99205 OFFICE O/P NEW HI 60 MIN: CPT | Performed by: PSYCHIATRY & NEUROLOGY

## 2019-04-04 PROCEDURE — 80177 DRUG SCRN QUAN LEVETIRACETAM: CPT | Performed by: PSYCHIATRY & NEUROLOGY

## 2019-04-04 PROCEDURE — 36415 COLL VENOUS BLD VENIPUNCTURE: CPT

## 2019-04-04 NOTE — TELEPHONE ENCOUNTER
----- Message from Duane Concepcion MD sent at 4/4/2019 12:25 PM EDT -----  Good morning    I am looking for a couple of records on this patient from [facility]–EEG and brain CT.  I do not know if they did a brain MRI but if so I would like that to.    Also I finished my note and then the computer destroyed it or gobbled it up.  Can you see if you can find a whole note including your review of systems?  The note simply disappeared right in the middle of dictation
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
## 2019-04-04 NOTE — TELEPHONE ENCOUNTER
Patient has been approved for surgery. See note from Dr. Concepcion. Patient can schedule surgery any time.

## 2019-04-04 NOTE — TELEPHONE ENCOUNTER
Called [facility] and was told that these results should be in the epic system.

## 2019-04-05 ENCOUNTER — PREP FOR SURGERY (OUTPATIENT)
Dept: OTHER | Facility: HOSPITAL | Age: 19
End: 2019-04-05

## 2019-04-05 DIAGNOSIS — K43.9 VENTRAL HERNIA WITHOUT OBSTRUCTION OR GANGRENE: Primary | ICD-10-CM

## 2019-04-05 RX ORDER — CEFAZOLIN SODIUM 2 G/50ML
2 SOLUTION INTRAVENOUS ONCE
Status: CANCELLED | OUTPATIENT
Start: 2019-04-05 | End: 2019-04-05

## 2019-04-05 NOTE — H&P
Walter Alvarado MD   Physician   General Surgery   H&P   Signed   Encounter Date:  2019               Signed                   Show:Clear all  []Manual[x]Template[]Copied    Added by:  [x]Walter Alvarado MD      []Niko for details            PATIENT INFORMATION  Cindy Guerin         - 2000     CHIEF COMPLAINT      Chief Complaint   Patient presents with   • Abdominal Pain   abdominal pain has increased since last visit. Ct completed 19 - at times she has nausea and vomiting     HISTORY OF PRESENT ILLNESS  HPI she is here today for follow-up on her CT scan.  She still complains of ongoing pain in the left lower quadrant.  He does have some nausea and periumbilical pain as well.           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]   • Drug abuse during pregnancy (CMS/HCC) [O99.320, F19.10]   • Chronic hepatitis C without hepatic coma (CMS/HCC) [B18.2]   • Chronic viral hepatitis B [B18.1]            PAST MEDICAL HISTORY       Past Medical History:   Diagnosis Date   • Anemia     • Gallstones       sched lap elyssa   • Hepatitis B     • Hepatitis C     • History of heart murmur in childhood     • History of kidney stones     • HSV-1 infection 2017   • Seizures (CMS/HCC)       last 18, also had one on 18, was seen at [facility]; has been referred to neuro by PCP            SURGICAL HISTORY        Past Surgical History:   Procedure Laterality Date   • ADENOIDECTOMY       •  SECTION         Twins 36 weeks   • TONSILLECTOMY       • WISDOM TOOTH EXTRACTION    "             FAMILY HISTORY        Family History   Problem Relation Age of Onset   • Cervical cancer Mother     • Cervical cancer Maternal Grandmother     • Diabetes Maternal Grandmother     • Brain cancer Other     • Diabetes Other     • Heart failure Other     • Diabetes Other     • Heart failure Other     • Other Sister           SAB   • Breast cancer Neg Hx     • Ovarian cancer Neg Hx     • Colon cancer Neg Hx              SOCIAL HISTORY  Social History            Occupational History       Employer: UNEMPLOYED   Tobacco Use   • Smoking status: Current Every Day Smoker       Packs/day: 0.50       Years: 10.00       Pack years: 5.00       Types: Cigarettes   • Smokeless tobacco: Never Used   Substance and Sexual Activity   • Alcohol use: No   • Drug use: Yes       Types: Methamphetamines       Comment: history of drug use- last use 8 monthsago   • Sexual activity: Yes       Partners: Male       Birth control/protection: None, Implant         Debilities/Disabilities Identified: None     Emotional Behavior: Appropriate     CURRENT MEDICATIONS     Current Outpatient Medications:   •  amoxicillin-clavulanate (AUGMENTIN) 875-125 MG per tablet, , Disp: , Rfl:   •  levETIRAcetam (KEPPRA) 500 MG tablet, , Disp: , Rfl:   •  NEXPLANON 68 MG implant subdermal implant, , Disp: , Rfl:      ALLERGIES  Sulfa antibiotics     VITALS      Vitals:     02/13/19 1441   BP: 120/64   Resp: 18   Weight: 60.3 kg (133 lb)   Height: 147.3 cm (58\")         LAST RESULTS                   Postpartum Visit on 02/05/2019   Component Date Value Ref Range Status   • Color 02/05/2019 Yellow  Yellow, Straw, Dark Yellow, Gregoria Final   • Clarity, UA 02/05/2019 Clear  Clear Final   • Glucose, UA 02/05/2019 Negative  Negative, 1000 mg/dL (3+) mg/dL Final   • Bilirubin 02/05/2019 Negative  Negative Final   • Ketones, UA 02/05/2019 Negative  Negative Final   • Specific Gravity  02/05/2019 1.005  1.005 - 1.030 Final   • Blood, UA 02/05/2019 Negative  " Negative Final   • pH, Urine 02/05/2019 5.0  5.0 - 8.0 Final   • Protein, POC 02/05/2019 Negative  Negative mg/dL Final   • Urobilinogen, UA 02/05/2019 Normal  Normal Final   • Leukocytes 02/05/2019 Negative  Negative Final   • Nitrite, UA 02/05/2019 Negative  Negative Final   • HCG, Urine, QL 02/05/2019 Negative  Negative Final   • Lot Number 02/05/2019 [id number]    Final   • Internal Positive Control 02/05/2019 Positive    Final   • Internal Negative Control 02/05/2019 Negative    Final      Ct Abdomen Pelvis Without Contrast     Addendum Date: 2/11/2019 Addendum:   In the left lower quadrant of the anterior abdominal wall located just below the area of marked dilated abnormality and just above the left inguinal region, there is a 2 cm x 1 cm circumscribed focus of fat tissue which could reflect a small hernia. Clinical correlation is recommended. Findings discussed with Dr. Alvarado at 2:45 PM on 2/11/2019.  This report was finalized on 2/11/2019 2:49 PM by Dr. Anish Watson MD.       Result Date: 2/11/2019  Narrative: CT SCAN OF THE ABDOMEN AND PELVIS WITHOUT CONTRAST 2/11/2019  HISTORY: Pain in mid abdomen with constipation since December 2018  TECHNIQUE: Spiral CT was performed through the abdomen and pelvis without oral or intravenous contrast administration as per clinician request. Radiation dose reduction techniques included automated exposure control or exposure modulation based on body size. Radiation audit for CT and nuclear cardiology exams in the last 12 months: 0.  ABDOMEN FINDINGS: Examination is somewhat limited by the lack of oral and intravenous contrast. The liver, spleen, pancreas, gallbladder and biliary tree and adrenal glands are normal. There are nonobstructing renal stones bilaterally.  PELVIS FINDINGS: The gut, mesenteric and jose structures are normal. The appendix is normal. There is a small perifocal hernia containing only fat. There is no free fluid in the abdomen or pelvis. Lung bases are  normal.       Impression: 1. Nonobstructing renal stones bilaterally. 2. Small periumbilical hernia containing only fat.  This report was finalized on 2/11/2019 2:00 PM by Dr. Anish Watson MD.          PHYSICAL EXAM  Physical Exam alert white female in no active distress.  Her heart shows a regular rate and rhythm her lungs are clear and equal.  Her gait is normal.  She has a reducible periumbilical hernia.  She has had a mass in the left lower quadrant.  CT scan of the abdomen was reviewed by me and does show cholelithiasis and a periumbilical hernia.  It also shows what appears to be a hernia sac in the left lower quadrant at the site of her concern.     ASSESSMENT  Ventral hernia x2, cholelithiasis        PLAN  I discussed the risk benefits and options with her mother in detail.  I would like to stage these procedures.  I would like to proceed with exploration of her left lower quadrant abdominal wall first because that is what she is primarily symptomatic from.  This may require a mesh repair so I would like to fall off on proceeding with a gallbladder surgery until later date.  Discussed risks of surgery with patient and in particular increased risk of wound infection, poor wound healing, hernias (with abdominal surgery) and post-operative pulmonary complications associated with smoking.   She has received neurology clearance                                                        Office Visit on 2/13/2019            Routing History            Detailed Report

## 2019-04-05 NOTE — H&P (VIEW-ONLY)
Walter Alvarado MD   Physician   General Surgery   H&P   Signed   Encounter Date:  2019               Signed                   Show:Clear all  []Manual[x]Template[]Copied    Added by:  [x]Walter Alvarado MD      []Niko for details            PATIENT INFORMATION  Cindy Guerin         - 2000     CHIEF COMPLAINT      Chief Complaint   Patient presents with   • Abdominal Pain   abdominal pain has increased since last visit. Ct completed 19 - at times she has nausea and vomiting     HISTORY OF PRESENT ILLNESS  HPI she is here today for follow-up on her CT scan.  She still complains of ongoing pain in the left lower quadrant.  He does have some nausea and periumbilical pain as well.           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]   • Drug abuse during pregnancy (CMS/HCC) [O99.320, F19.10]   • Chronic hepatitis C without hepatic coma (CMS/HCC) [B18.2]   • Chronic viral hepatitis B [B18.1]            PAST MEDICAL HISTORY       Past Medical History:   Diagnosis Date   • Anemia     • Gallstones       sched lap elyssa   • Hepatitis B     • Hepatitis C     • History of heart murmur in childhood     • History of kidney stones     • HSV-1 infection 2017   • Seizures (CMS/HCC)       last 18, also had one on 18, was seen at [facility]; has been referred to neuro by PCP            SURGICAL HISTORY        Past Surgical History:   Procedure Laterality Date   • ADENOIDECTOMY       •  SECTION         Twins 36 weeks   • TONSILLECTOMY       • WISDOM TOOTH EXTRACTION    "             FAMILY HISTORY        Family History   Problem Relation Age of Onset   • Cervical cancer Mother     • Cervical cancer Maternal Grandmother     • Diabetes Maternal Grandmother     • Brain cancer Other     • Diabetes Other     • Heart failure Other     • Diabetes Other     • Heart failure Other     • Other Sister           SAB   • Breast cancer Neg Hx     • Ovarian cancer Neg Hx     • Colon cancer Neg Hx              SOCIAL HISTORY  Social History            Occupational History       Employer: UNEMPLOYED   Tobacco Use   • Smoking status: Current Every Day Smoker       Packs/day: 0.50       Years: 10.00       Pack years: 5.00       Types: Cigarettes   • Smokeless tobacco: Never Used   Substance and Sexual Activity   • Alcohol use: No   • Drug use: Yes       Types: Methamphetamines       Comment: history of drug use- last use 8 monthsago   • Sexual activity: Yes       Partners: Male       Birth control/protection: None, Implant         Debilities/Disabilities Identified: None     Emotional Behavior: Appropriate     CURRENT MEDICATIONS     Current Outpatient Medications:   •  amoxicillin-clavulanate (AUGMENTIN) 875-125 MG per tablet, , Disp: , Rfl:   •  levETIRAcetam (KEPPRA) 500 MG tablet, , Disp: , Rfl:   •  NEXPLANON 68 MG implant subdermal implant, , Disp: , Rfl:      ALLERGIES  Sulfa antibiotics     VITALS      Vitals:     02/13/19 1441   BP: 120/64   Resp: 18   Weight: 60.3 kg (133 lb)   Height: 147.3 cm (58\")         LAST RESULTS                   Postpartum Visit on 02/05/2019   Component Date Value Ref Range Status   • Color 02/05/2019 Yellow  Yellow, Straw, Dark Yellow, Gregoria Final   • Clarity, UA 02/05/2019 Clear  Clear Final   • Glucose, UA 02/05/2019 Negative  Negative, 1000 mg/dL (3+) mg/dL Final   • Bilirubin 02/05/2019 Negative  Negative Final   • Ketones, UA 02/05/2019 Negative  Negative Final   • Specific Gravity  02/05/2019 1.005  1.005 - 1.030 Final   • Blood, UA 02/05/2019 Negative  " Negative Final   • pH, Urine 02/05/2019 5.0  5.0 - 8.0 Final   • Protein, POC 02/05/2019 Negative  Negative mg/dL Final   • Urobilinogen, UA 02/05/2019 Normal  Normal Final   • Leukocytes 02/05/2019 Negative  Negative Final   • Nitrite, UA 02/05/2019 Negative  Negative Final   • HCG, Urine, QL 02/05/2019 Negative  Negative Final   • Lot Number 02/05/2019 [id number]    Final   • Internal Positive Control 02/05/2019 Positive    Final   • Internal Negative Control 02/05/2019 Negative    Final      Ct Abdomen Pelvis Without Contrast     Addendum Date: 2/11/2019 Addendum:   In the left lower quadrant of the anterior abdominal wall located just below the area of marked dilated abnormality and just above the left inguinal region, there is a 2 cm x 1 cm circumscribed focus of fat tissue which could reflect a small hernia. Clinical correlation is recommended. Findings discussed with Dr. Alvarado at 2:45 PM on 2/11/2019.  This report was finalized on 2/11/2019 2:49 PM by Dr. Anish Watson MD.       Result Date: 2/11/2019  Narrative: CT SCAN OF THE ABDOMEN AND PELVIS WITHOUT CONTRAST 2/11/2019  HISTORY: Pain in mid abdomen with constipation since December 2018  TECHNIQUE: Spiral CT was performed through the abdomen and pelvis without oral or intravenous contrast administration as per clinician request. Radiation dose reduction techniques included automated exposure control or exposure modulation based on body size. Radiation audit for CT and nuclear cardiology exams in the last 12 months: 0.  ABDOMEN FINDINGS: Examination is somewhat limited by the lack of oral and intravenous contrast. The liver, spleen, pancreas, gallbladder and biliary tree and adrenal glands are normal. There are nonobstructing renal stones bilaterally.  PELVIS FINDINGS: The gut, mesenteric and jose structures are normal. The appendix is normal. There is a small perifocal hernia containing only fat. There is no free fluid in the abdomen or pelvis. Lung bases are  normal.       Impression: 1. Nonobstructing renal stones bilaterally. 2. Small periumbilical hernia containing only fat.  This report was finalized on 2/11/2019 2:00 PM by Dr. Anish Watson MD.          PHYSICAL EXAM  Physical Exam alert white female in no active distress.  Her heart shows a regular rate and rhythm her lungs are clear and equal.  Her gait is normal.  She has a reducible periumbilical hernia.  She has had a mass in the left lower quadrant.  CT scan of the abdomen was reviewed by me and does show cholelithiasis and a periumbilical hernia.  It also shows what appears to be a hernia sac in the left lower quadrant at the site of her concern.     ASSESSMENT  Ventral hernia x2, cholelithiasis        PLAN  I discussed the risk benefits and options with her mother in detail.  I would like to stage these procedures.  I would like to proceed with exploration of her left lower quadrant abdominal wall first because that is what she is primarily symptomatic from.  This may require a mesh repair so I would like to fall off on proceeding with a gallbladder surgery until later date.  Discussed risks of surgery with patient and in particular increased risk of wound infection, poor wound healing, hernias (with abdominal surgery) and post-operative pulmonary complications associated with smoking.   She has received neurology clearance                                                        Office Visit on 2/13/2019            Routing History            Detailed Report

## 2019-04-07 ENCOUNTER — LAB (OUTPATIENT)
Dept: LAB | Facility: HOSPITAL | Age: 19
End: 2019-04-07

## 2019-04-07 DIAGNOSIS — K43.9 VENTRAL HERNIA WITHOUT OBSTRUCTION OR GANGRENE: ICD-10-CM

## 2019-04-07 LAB — HCG SERPL QL: NEGATIVE

## 2019-04-07 PROCEDURE — 36415 COLL VENOUS BLD VENIPUNCTURE: CPT

## 2019-04-07 PROCEDURE — 84703 CHORIONIC GONADOTROPIN ASSAY: CPT

## 2019-04-08 ENCOUNTER — ANESTHESIA (OUTPATIENT)
Dept: PERIOP | Facility: HOSPITAL | Age: 19
End: 2019-04-08

## 2019-04-08 ENCOUNTER — HOSPITAL ENCOUNTER (OUTPATIENT)
Facility: HOSPITAL | Age: 19
Setting detail: HOSPITAL OUTPATIENT SURGERY
Discharge: HOME | End: 2019-04-08
Attending: SURGERY | Admitting: SURGERY

## 2019-04-08 ENCOUNTER — ANESTHESIA EVENT (OUTPATIENT)
Dept: PERIOP | Facility: HOSPITAL | Age: 19
End: 2019-04-08

## 2019-04-08 VITALS
RESPIRATION RATE: 14 BRPM | WEIGHT: 141 LBS | DIASTOLIC BLOOD PRESSURE: 73 MMHG | HEIGHT: 59 IN | TEMPERATURE: 97.6 F | HEART RATE: 79 BPM | OXYGEN SATURATION: 98 % | SYSTOLIC BLOOD PRESSURE: 103 MMHG | BODY MASS INDEX: 28.43 KG/M2

## 2019-04-08 DIAGNOSIS — K43.9 VENTRAL HERNIA WITHOUT OBSTRUCTION OR GANGRENE: ICD-10-CM

## 2019-04-08 LAB — LEVETIRACETAM SERPL-MCNC: 33.3 UG/ML (ref 10–40)

## 2019-04-08 PROCEDURE — 25010000002 FENTANYL CITRATE (PF) 100 MCG/2ML SOLUTION: Performed by: NURSE ANESTHETIST, CERTIFIED REGISTERED

## 2019-04-08 PROCEDURE — 25010000002 ONDANSETRON PER 1 MG: Performed by: ANESTHESIOLOGY

## 2019-04-08 PROCEDURE — 25010000002 PROPOFOL 10 MG/ML EMULSION: Performed by: NURSE ANESTHETIST, CERTIFIED REGISTERED

## 2019-04-08 PROCEDURE — 88304 TISSUE EXAM BY PATHOLOGIST: CPT | Performed by: SURGERY

## 2019-04-08 PROCEDURE — 25010000002 KETOROLAC TROMETHAMINE PER 15 MG: Performed by: NURSE ANESTHETIST, CERTIFIED REGISTERED

## 2019-04-08 PROCEDURE — 25010000002 SUCCINYLCHOLINE PER 20 MG: Performed by: NURSE ANESTHETIST, CERTIFIED REGISTERED

## 2019-04-08 PROCEDURE — 25010000003 CEFAZOLIN SODIUM-DEXTROSE 2-3 GM-%(50ML) RECONSTITUTED SOLUTION: Performed by: SURGERY

## 2019-04-08 PROCEDURE — 25010000002 MIDAZOLAM PER 1 MG: Performed by: ANESTHESIOLOGY

## 2019-04-08 PROCEDURE — 22902 EXC ABD LES SC < 3 CM: CPT | Performed by: SURGERY

## 2019-04-08 PROCEDURE — 25010000002 DEXAMETHASONE PER 1 MG: Performed by: ANESTHESIOLOGY

## 2019-04-08 RX ORDER — SODIUM CHLORIDE, SODIUM LACTATE, POTASSIUM CHLORIDE, AND CALCIUM CHLORIDE .6; .31; .03; .02 G/100ML; G/100ML; G/100ML; G/100ML
IRRIGANT IRRIGATION ONCE
Status: COMPLETED | OUTPATIENT
Start: 2019-04-08 | End: 2019-04-08

## 2019-04-08 RX ORDER — ACETAMINOPHEN 650 MG/1
650 SUPPOSITORY RECTAL ONCE AS NEEDED
Status: DISCONTINUED | OUTPATIENT
Start: 2019-04-08 | End: 2019-04-08 | Stop reason: HOSPADM

## 2019-04-08 RX ORDER — MIDAZOLAM HYDROCHLORIDE 1 MG/ML
1 INJECTION INTRAMUSCULAR; INTRAVENOUS
Status: DISCONTINUED | OUTPATIENT
Start: 2019-04-08 | End: 2019-04-08 | Stop reason: HOSPADM

## 2019-04-08 RX ORDER — KETAMINE HYDROCHLORIDE 100 MG/ML
INJECTION INTRAMUSCULAR; INTRAVENOUS AS NEEDED
Status: DISCONTINUED | OUTPATIENT
Start: 2019-04-08 | End: 2019-04-08 | Stop reason: SURG

## 2019-04-08 RX ORDER — SODIUM CHLORIDE 0.9 % (FLUSH) 0.9 %
3 SYRINGE (ML) INJECTION EVERY 12 HOURS SCHEDULED
Status: DISCONTINUED | OUTPATIENT
Start: 2019-04-08 | End: 2019-04-08 | Stop reason: HOSPADM

## 2019-04-08 RX ORDER — CEFAZOLIN SODIUM 2 G/50ML
2 SOLUTION INTRAVENOUS ONCE
Status: COMPLETED | OUTPATIENT
Start: 2019-04-08 | End: 2019-04-08

## 2019-04-08 RX ORDER — SUCCINYLCHOLINE CHLORIDE 20 MG/ML
INJECTION INTRAMUSCULAR; INTRAVENOUS AS NEEDED
Status: DISCONTINUED | OUTPATIENT
Start: 2019-04-08 | End: 2019-04-08 | Stop reason: SURG

## 2019-04-08 RX ORDER — MIDAZOLAM HYDROCHLORIDE 1 MG/ML
2 INJECTION INTRAMUSCULAR; INTRAVENOUS
Status: DISCONTINUED | OUTPATIENT
Start: 2019-04-08 | End: 2019-04-08 | Stop reason: HOSPADM

## 2019-04-08 RX ORDER — ACETAMINOPHEN 325 MG/1
650 TABLET ORAL ONCE AS NEEDED
Status: DISCONTINUED | OUTPATIENT
Start: 2019-04-08 | End: 2019-04-08 | Stop reason: HOSPADM

## 2019-04-08 RX ORDER — FENTANYL CITRATE 50 UG/ML
INJECTION, SOLUTION INTRAMUSCULAR; INTRAVENOUS AS NEEDED
Status: DISCONTINUED | OUTPATIENT
Start: 2019-04-08 | End: 2019-04-08 | Stop reason: SURG

## 2019-04-08 RX ORDER — OXYCODONE HYDROCHLORIDE AND ACETAMINOPHEN 5; 325 MG/1; MG/1
1-2 TABLET ORAL EVERY 4 HOURS PRN
Qty: 30 TABLET | Refills: 0 | Status: SHIPPED | OUTPATIENT
Start: 2019-04-08 | End: 2019-04-23

## 2019-04-08 RX ORDER — KETOROLAC TROMETHAMINE 30 MG/ML
INJECTION, SOLUTION INTRAMUSCULAR; INTRAVENOUS AS NEEDED
Status: DISCONTINUED | OUTPATIENT
Start: 2019-04-08 | End: 2019-04-08 | Stop reason: SURG

## 2019-04-08 RX ORDER — LIDOCAINE HYDROCHLORIDE 10 MG/ML
0.5 INJECTION, SOLUTION INFILTRATION; PERINEURAL ONCE
Status: COMPLETED | OUTPATIENT
Start: 2019-04-08 | End: 2019-04-08

## 2019-04-08 RX ORDER — DEXAMETHASONE SODIUM PHOSPHATE 4 MG/ML
8 INJECTION, SOLUTION INTRA-ARTICULAR; INTRALESIONAL; INTRAMUSCULAR; INTRAVENOUS; SOFT TISSUE ONCE
Status: COMPLETED | OUTPATIENT
Start: 2019-04-08 | End: 2019-04-08

## 2019-04-08 RX ORDER — ROCURONIUM BROMIDE 10 MG/ML
INJECTION, SOLUTION INTRAVENOUS AS NEEDED
Status: DISCONTINUED | OUTPATIENT
Start: 2019-04-08 | End: 2019-04-08 | Stop reason: SURG

## 2019-04-08 RX ORDER — SODIUM CHLORIDE 0.9 % (FLUSH) 0.9 %
1-10 SYRINGE (ML) INJECTION AS NEEDED
Status: DISCONTINUED | OUTPATIENT
Start: 2019-04-08 | End: 2019-04-08 | Stop reason: HOSPADM

## 2019-04-08 RX ORDER — SODIUM CHLORIDE, SODIUM LACTATE, POTASSIUM CHLORIDE, CALCIUM CHLORIDE 600; 310; 30; 20 MG/100ML; MG/100ML; MG/100ML; MG/100ML
INJECTION, SOLUTION INTRAVENOUS CONTINUOUS PRN
Status: DISCONTINUED | OUTPATIENT
Start: 2019-04-08 | End: 2019-04-08 | Stop reason: SURG

## 2019-04-08 RX ORDER — GLYCOPYRROLATE 0.2 MG/ML
INJECTION INTRAMUSCULAR; INTRAVENOUS AS NEEDED
Status: DISCONTINUED | OUTPATIENT
Start: 2019-04-08 | End: 2019-04-08 | Stop reason: SURG

## 2019-04-08 RX ORDER — BUPIVACAINE HYDROCHLORIDE 2.5 MG/ML
INJECTION, SOLUTION EPIDURAL; INFILTRATION; INTRACAUDAL AS NEEDED
Status: DISCONTINUED | OUTPATIENT
Start: 2019-04-08 | End: 2019-04-08 | Stop reason: HOSPADM

## 2019-04-08 RX ORDER — ONDANSETRON 2 MG/ML
4 INJECTION INTRAMUSCULAR; INTRAVENOUS ONCE AS NEEDED
Status: COMPLETED | OUTPATIENT
Start: 2019-04-08 | End: 2019-04-08

## 2019-04-08 RX ORDER — MEPERIDINE HYDROCHLORIDE 25 MG/ML
12.5 INJECTION INTRAMUSCULAR; INTRAVENOUS; SUBCUTANEOUS
Status: DISCONTINUED | OUTPATIENT
Start: 2019-04-08 | End: 2019-04-08 | Stop reason: HOSPADM

## 2019-04-08 RX ORDER — PROPOFOL 10 MG/ML
VIAL (ML) INTRAVENOUS AS NEEDED
Status: DISCONTINUED | OUTPATIENT
Start: 2019-04-08 | End: 2019-04-08 | Stop reason: SURG

## 2019-04-08 RX ORDER — MAGNESIUM HYDROXIDE 1200 MG/15ML
LIQUID ORAL AS NEEDED
Status: DISCONTINUED | OUTPATIENT
Start: 2019-04-08 | End: 2019-04-08 | Stop reason: HOSPADM

## 2019-04-08 RX ORDER — OXYCODONE HYDROCHLORIDE AND ACETAMINOPHEN 5; 325 MG/1; MG/1
1 TABLET ORAL ONCE AS NEEDED
Status: COMPLETED | OUTPATIENT
Start: 2019-04-08 | End: 2019-04-08

## 2019-04-08 RX ORDER — ONDANSETRON 2 MG/ML
4 INJECTION INTRAMUSCULAR; INTRAVENOUS ONCE AS NEEDED
Status: DISCONTINUED | OUTPATIENT
Start: 2019-04-08 | End: 2019-04-08 | Stop reason: HOSPADM

## 2019-04-08 RX ORDER — LIDOCAINE HYDROCHLORIDE 20 MG/ML
INJECTION, SOLUTION INFILTRATION; PERINEURAL AS NEEDED
Status: DISCONTINUED | OUTPATIENT
Start: 2019-04-08 | End: 2019-04-08 | Stop reason: SURG

## 2019-04-08 RX ORDER — SODIUM CHLORIDE 9 MG/ML
40 INJECTION, SOLUTION INTRAVENOUS AS NEEDED
Status: DISCONTINUED | OUTPATIENT
Start: 2019-04-08 | End: 2019-04-08 | Stop reason: HOSPADM

## 2019-04-08 RX ADMIN — FENTANYL CITRATE 25 MCG: 50 INJECTION, SOLUTION INTRAMUSCULAR; INTRAVENOUS at 08:10

## 2019-04-08 RX ADMIN — PROPOFOL 150 MG: 10 INJECTION, EMULSION INTRAVENOUS at 08:06

## 2019-04-08 RX ADMIN — SODIUM CHLORIDE, SODIUM LACTATE, POTASSIUM CHLORIDE, AND CALCIUM CHLORIDE: .6; .31; .03; .02 IRRIGANT IRRIGATION at 07:50

## 2019-04-08 RX ADMIN — LIDOCAINE HYDROCHLORIDE 80 MG: 20 INJECTION, SOLUTION INFILTRATION; PERINEURAL at 08:04

## 2019-04-08 RX ADMIN — CEFAZOLIN SODIUM 2 G: 2 SOLUTION INTRAVENOUS at 08:03

## 2019-04-08 RX ADMIN — KETAMINE HYDROCHLORIDE 10 MG: 100 INJECTION INTRAMUSCULAR; INTRAVENOUS at 08:04

## 2019-04-08 RX ADMIN — GLYCOPYRROLATE 0.1 MG: 0.2 INJECTION INTRAMUSCULAR; INTRAVENOUS at 08:04

## 2019-04-08 RX ADMIN — SODIUM CHLORIDE, POTASSIUM CHLORIDE, SODIUM LACTATE AND CALCIUM CHLORIDE: 600; 310; 30; 20 INJECTION, SOLUTION INTRAVENOUS at 07:59

## 2019-04-08 RX ADMIN — ONDANSETRON 4 MG: 2 INJECTION, SOLUTION INTRAMUSCULAR; INTRAVENOUS at 07:47

## 2019-04-08 RX ADMIN — FAMOTIDINE 20 MG: 10 INJECTION, SOLUTION INTRAVENOUS at 07:47

## 2019-04-08 RX ADMIN — FENTANYL CITRATE 25 MCG: 50 INJECTION, SOLUTION INTRAMUSCULAR; INTRAVENOUS at 08:06

## 2019-04-08 RX ADMIN — ROCURONIUM BROMIDE 2 MG: 10 INJECTION INTRAVENOUS at 08:04

## 2019-04-08 RX ADMIN — KETOROLAC TROMETHAMINE 30 MG: 30 INJECTION INTRAMUSCULAR; INTRAVENOUS at 08:35

## 2019-04-08 RX ADMIN — SUCCINYLCHOLINE CHLORIDE 100 MG: 20 INJECTION, SOLUTION INTRAMUSCULAR; INTRAVENOUS at 08:06

## 2019-04-08 RX ADMIN — OXYCODONE HYDROCHLORIDE AND ACETAMINOPHEN 1 TABLET: 5; 325 TABLET ORAL at 09:32

## 2019-04-08 RX ADMIN — FENTANYL CITRATE 25 MCG: 50 INJECTION, SOLUTION INTRAMUSCULAR; INTRAVENOUS at 08:43

## 2019-04-08 RX ADMIN — FENTANYL CITRATE 25 MCG: 50 INJECTION, SOLUTION INTRAMUSCULAR; INTRAVENOUS at 08:04

## 2019-04-08 RX ADMIN — MIDAZOLAM HYDROCHLORIDE 2 MG: 1 INJECTION, SOLUTION INTRAMUSCULAR; INTRAVENOUS at 07:48

## 2019-04-08 RX ADMIN — LIDOCAINE HYDROCHLORIDE 0.5 ML: 10 INJECTION, SOLUTION EPIDURAL; INFILTRATION; INTRACAUDAL; PERINEURAL at 07:50

## 2019-04-08 RX ADMIN — DEXAMETHASONE SODIUM PHOSPHATE 8 MG: 4 INJECTION, SOLUTION INTRAMUSCULAR; INTRAVENOUS at 07:47

## 2019-04-08 RX ADMIN — KETAMINE HYDROCHLORIDE 10 MG: 100 INJECTION INTRAMUSCULAR; INTRAVENOUS at 08:22

## 2019-04-08 NOTE — ANESTHESIA POSTPROCEDURE EVALUATION
Patient: Cindy Guerin    Procedure Summary     Date:  04/08/19 Room / Location:   [facility] OR 1 /  [facility]    Anesthesia Start:  0759 Anesthesia Stop:  0854    Procedure:  EXPLORATION OF THE ABDOMINAL WALL, REMOVAL OF 2.8CM LIPOMA (Left Abdomen) Diagnosis:       Ventral hernia without obstruction or gangrene      (Ventral hernia without obstruction or gangrene [K43.9])    Surgeon:  Walter Alvarado MD Provider:  John Almaraz CRNA    Anesthesia Type:  general ASA Status:  2          Anesthesia Type: general  Last vitals  BP   103/73 (04/08/19 0941)   Temp   97.6 °F (36.4 °C) (04/08/19 0920)   Pulse   79 (04/08/19 0941)   Resp   14 (04/08/19 0941)     SpO2   98 % (04/08/19 0941)     Post Anesthesia Care and Evaluation    Patient location during evaluation: PHASE II  Patient participation: complete - patient participated  Level of consciousness: awake and alert  Pain score: 1  Pain management: satisfactory to patient  Airway patency: patent  Anesthetic complications: No anesthetic complications  PONV Status: none  Cardiovascular status: acceptable  Respiratory status: acceptable  Hydration status: acceptable

## 2019-04-08 NOTE — OP NOTE
Preoperative diagnosis left lower quadrant mass questionable ventral hernia  Postoperative diagnosis left lower quadrant mass lipoma  Procedure exploration of abdominal wall excision of 2.8 cm lipoma  Complications none  Drains none  Specimen lipoma to pathology  Estimated blood loss 5 cc  Anesthesia General via endotracheal tube  Surgeon Dr. Alvarado  Assistant none  Findings no evidence of ventral hernia 2.8 cm lipomatous mass  Indications patient presented with left lower quadrant pain CT scan was performed there was an irregularity in the soft tissue of the left lower quadrant that was thought to be a small hernia  Procedure after satisfactory induction of general anesthesia the endotracheal tube the patient's abdomen was prepped and draped in sterile fashion.  Transverse skin incision was made over the area that was marked carried down through skin and subcutaneous tissue.  We encountered a lipomatous mass this was excised with use of electrocautery.  It was measured was 2.8 cm.  Fascia was exposed in the left lower quadrant and in conjunction with the CT scan the area was explored.  We saw no evidence of a fascial defect.  Hemostasis was assured all counts were correct subcutaneous tissue was closed in interrupted simple fashion with use of 3-0 Vicryl.  Skin was closed with 4-0 Monocryl running subcuticular stitch burying the knots.  Skin and subcutaneous tissue was locally infiltrated quarter percent Marcaine without epinephrine.  Wound was clean and dry and sterilely dressed.  The patient tolerated the procedure well was transferred to the recovery room in stable condition.  When she is awake alert stable tolerating p.o. and has avoided she will be discharged home to follow-up in my office next week call for problems.  Diet as tolerated she may shower in the a.m.  She was given a prescription for Percocet 5/325 1-2 p.o. every 4 hours as needed pain 30 these were dispensed without refills.

## 2019-04-08 NOTE — ANESTHESIA PREPROCEDURE EVALUATION
" Anesthesia Evaluation     Patient summary reviewed and Nursing notes reviewed   no history of anesthetic complications:  NPO Solid Status: > 8 hours  NPO Liquid Status: > 2 hours           Airway   Mallampati: I  TM distance: >3 FB  Neck ROM: full  No difficulty expected  Dental - normal exam     Pulmonary - normal exam    breath sounds clear to auscultation  (+) a smoker (one half pack day) Current Smoked day of surgery,   Cardiovascular - normal exam  Exercise tolerance: good (4-7 METS)    ECG reviewed  Rhythm: regular  Rate: normal      ROS comment: Reading physician: Teresa Ann MD Ordering physician: Fawn Berry MD Study date: 3/14/19  Patient Information     Patient Name  Cindy Guerin MRN  [id number] Sex  Female  (Age)  2000 (19 y.o.)  Interpretation Summary     · Negative clinical evidence of myocardial ischemia. Findings consistent with a normal ECG stress test.    Patient Name  Cindy Guerin MRN  [id number] Sex  Female  (Age)  2000 (19 y.o.)  Sedation Narrator Report     Sedation Narrator Report  Interpretation Summary     · Mild mitral valve regurgitation is present  · Mild tricuspid valve regurgitation is present.  · Calculated EF = 56.0%.  · There is no evidence of pericardial effusion.    Fawn Berry MD     2018  8:35 AM    ECG 12 Lead  Date/Time: 2018 2:38 PM  Performed by: FAWN BERRY  Authorized by: FAWN BERRY   Comparison: not compared with previous ECG   Previous ECG: no previous ECG available  Rhythm: sinus rhythm  ST Flattening: all    Neuro/Psych  (+) seizures (last seizure 2019 neuro said due to lack of sleep) well controlled,     GI/Hepatic/Renal/Endo    (+)   hepatitis (patient states B \"went away\") B and C, renal disease (in past) stones,     Musculoskeletal (-) negative ROS    Abdominal  - normal exam   Substance History - negative use  Drug use: denies.     OB/GYN negative ob/gyn ROS         Other - " negative ROS                     Anesthesia Plan    ASA 2     general     intravenous induction   Anesthetic plan, all risks, benefits, and alternatives have been provided, discussed and informed consent has been obtained with: patient and spouse/significant other.

## 2019-04-08 NOTE — INTERVAL H&P NOTE
"  H&P updated. The patient was examined and the following changes are noted:  vs  /70 (BP Location: Left arm, Patient Position: Lying)   Pulse 83   Temp 98.3 °F (36.8 °C) (Oral)   Resp 16   Ht 149.9 cm (59.02\")   Wt 64 kg (141 lb)   SpO2 98%   Breastfeeding? No   BMI 28.46 kg/m²         "
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
## 2019-04-08 NOTE — ANESTHESIA PROCEDURE NOTES
Airway  Urgency: elective    Airway not difficult    General Information and Staff    Patient location during procedure: OR  CRNA: John Almaraz CRNA    Indications and Patient Condition  Indications for airway management: airway protection    Preoxygenated: yes  MILS maintained throughout  Mask difficulty assessment: 1 - vent by mask    Final Airway Details  Final airway type: endotracheal airway      Successful airway: ETT  Cuffed: yes   Successful intubation technique: direct laryngoscopy  Endotracheal tube insertion site: oral  Blade: Francesca  Blade size: 3 (3.5)  ETT size (mm): 7.0  Placement verified by: chest auscultation and capnometry   Measured from: lips  ETT to lips (cm): 21  Number of attempts at approach: 1    Additional Comments  To OR, monitors and O2 on. Smooth IV ind. - intubated x 1 attempt through clear cords + BBS. Tape OU. Pressure points checked and padded.

## 2019-04-09 LAB
LAB AP CASE REPORT: NORMAL
PATH REPORT.FINAL DX SPEC: NORMAL
PATH REPORT.GROSS SPEC: NORMAL

## 2019-04-15 ENCOUNTER — OFFICE VISIT (OUTPATIENT)
Dept: SURGERY | Facility: CLINIC | Age: 19
End: 2019-04-15

## 2019-04-15 DIAGNOSIS — Z09 SURGICAL FOLLOW-UP CARE: Primary | ICD-10-CM

## 2019-04-15 PROCEDURE — 99024 POSTOP FOLLOW-UP VISIT: CPT | Performed by: SURGERY

## 2019-04-21 ENCOUNTER — HOSPITAL ENCOUNTER (EMERGENCY)
Facility: HOSPITAL | Age: 19
Discharge: HOME | End: 2019-04-21
Attending: EMERGENCY MEDICINE | Admitting: EMERGENCY MEDICINE

## 2019-04-21 VITALS
HEIGHT: 59 IN | WEIGHT: 141.09 LBS | TEMPERATURE: 98.6 F | DIASTOLIC BLOOD PRESSURE: 75 MMHG | RESPIRATION RATE: 18 BRPM | SYSTOLIC BLOOD PRESSURE: 124 MMHG | OXYGEN SATURATION: 100 % | BODY MASS INDEX: 28.44 KG/M2 | HEART RATE: 80 BPM

## 2019-04-21 DIAGNOSIS — N83.202 CYST OF LEFT OVARY: ICD-10-CM

## 2019-04-21 DIAGNOSIS — S30.1XXD ABDOMINAL WALL SEROMA, SUBSEQUENT ENCOUNTER: Primary | ICD-10-CM

## 2019-04-21 LAB
B-HCG UR QL: NEGATIVE
BASOPHILS # BLD AUTO: 0.03 10*3/MM3 (ref 0–0.2)
BASOPHILS NFR BLD AUTO: 0.3 % (ref 0–1.5)
DEPRECATED RDW RBC AUTO: 45.1 FL (ref 37–54)
EOSINOPHIL # BLD AUTO: 0.5 10*3/MM3 (ref 0–0.4)
EOSINOPHIL NFR BLD AUTO: 5.2 % (ref 0.3–6.2)
ERYTHROCYTE [DISTWIDTH] IN BLOOD BY AUTOMATED COUNT: 13.8 % (ref 12.3–15.4)
HCT VFR BLD AUTO: 39.6 % (ref 34–46.6)
HGB BLD-MCNC: 12.8 G/DL (ref 12–15.9)
HOLD SPECIMEN: NORMAL
HOLD SPECIMEN: NORMAL
IMM GRANULOCYTES # BLD AUTO: 0.02 10*3/MM3 (ref 0–0.05)
IMM GRANULOCYTES NFR BLD AUTO: 0.2 % (ref 0–0.5)
LYMPHOCYTES # BLD AUTO: 4.4 10*3/MM3 (ref 0.7–3.1)
LYMPHOCYTES NFR BLD AUTO: 45.6 % (ref 19.6–45.3)
MCH RBC QN AUTO: 28.8 PG (ref 26.6–33)
MCHC RBC AUTO-ENTMCNC: 32.3 G/DL (ref 31.5–35.7)
MCV RBC AUTO: 89.2 FL (ref 79–97)
MONOCYTES # BLD AUTO: 0.89 10*3/MM3 (ref 0.1–0.9)
MONOCYTES NFR BLD AUTO: 9.2 % (ref 5–12)
NEUTROPHILS # BLD AUTO: 3.8 10*3/MM3 (ref 1.7–7)
NEUTROPHILS NFR BLD AUTO: 39.5 % (ref 42.7–76)
NRBC BLD AUTO-RTO: 0 /100 WBC (ref 0–0.2)
PLATELET # BLD AUTO: 320 10*3/MM3 (ref 140–450)
PMV BLD AUTO: 9.8 FL (ref 6–12)
RBC # BLD AUTO: 4.44 10*6/MM3 (ref 3.77–5.28)
WBC NRBC COR # BLD: 9.64 10*3/MM3 (ref 3.4–10.8)
WHOLE BLOOD HOLD SPECIMEN: NORMAL
WHOLE BLOOD HOLD SPECIMEN: NORMAL

## 2019-04-21 PROCEDURE — 99284 EMERGENCY DEPT VISIT MOD MDM: CPT

## 2019-04-21 PROCEDURE — 81025 URINE PREGNANCY TEST: CPT | Performed by: PHYSICIAN ASSISTANT

## 2019-04-21 PROCEDURE — 85025 COMPLETE CBC W/AUTO DIFF WBC: CPT | Performed by: PHYSICIAN ASSISTANT

## 2019-04-21 PROCEDURE — 99282 EMERGENCY DEPT VISIT SF MDM: CPT | Performed by: PHYSICIAN ASSISTANT

## 2019-04-21 RX ORDER — IBUPROFEN 800 MG/1
800 TABLET ORAL EVERY 8 HOURS PRN
Qty: 20 TABLET | Refills: 0 | Status: SHIPPED | OUTPATIENT
Start: 2019-04-21 | End: 2019-04-21

## 2019-04-21 RX ORDER — IBUPROFEN 400 MG/1
800 TABLET ORAL ONCE
Status: DISCONTINUED | OUTPATIENT
Start: 2019-04-21 | End: 2019-04-21

## 2019-04-21 RX ORDER — NAPROXEN 250 MG/1
500 TABLET ORAL ONCE
Status: COMPLETED | OUTPATIENT
Start: 2019-04-21 | End: 2019-04-21

## 2019-04-21 RX ORDER — NAPROXEN 500 MG/1
500 TABLET ORAL 2 TIMES DAILY PRN
Qty: 20 TABLET | Refills: 0 | Status: ON HOLD | OUTPATIENT
Start: 2019-04-21 | End: 2019-05-03

## 2019-04-21 RX ADMIN — NAPROXEN 500 MG: 250 TABLET ORAL at 19:57

## 2019-04-22 RX ORDER — LEVETIRACETAM 500 MG/1
2000 TABLET ORAL 2 TIMES DAILY
Qty: 240 TABLET | Refills: 5 | Status: SHIPPED | OUTPATIENT
Start: 2019-04-22

## 2019-04-23 ENCOUNTER — OFFICE VISIT (OUTPATIENT)
Dept: SURGERY | Facility: CLINIC | Age: 19
End: 2019-04-23

## 2019-04-23 DIAGNOSIS — K43.9 VENTRAL HERNIA WITHOUT OBSTRUCTION OR GANGRENE: Primary | ICD-10-CM

## 2019-04-23 DIAGNOSIS — K80.20 CALCULUS OF GALLBLADDER WITHOUT CHOLECYSTITIS WITHOUT OBSTRUCTION: ICD-10-CM

## 2019-04-23 PROCEDURE — 99213 OFFICE O/P EST LOW 20 MIN: CPT | Performed by: SURGERY

## 2019-04-23 NOTE — H&P
PATIENT INFORMATION  Cindy Guerin       - 2000    CHIEF COMPLAINT  Chief Complaint   Patient presents with   • Post-op   2 wk PO lipoma removal with abdominal wall exploration - she was seen in [facility] ER on 19 and [facility] ER on 19, states she is still having increased pain and she is completely out of pain medication      HISTORY OF PRESENT ILLNESS  HPI  She went to the emergency room on several occasions this week and complaining of swelling of her left lower quadrant abdominal wall and pain.  She is CT scan performed in [place] that was consistent with a seroma.  She still complains of epigastric and right upper quadrant pain related to her gallstones she denies any fevers or chills or jaundice type symptoms and she complains of pain around her periumbilical hernia.      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]   • Drug abuse during pregnancy (CMS/HCC) [O99.320, F19.10]   • Chronic hepatitis C without hepatic coma (CMS/HCC) [B18.2]   • Chronic viral hepatitis B [B18.1]         PAST MEDICAL HISTORY  Past Medical History:   Diagnosis Date   • Anemia    • Gallstones     sched lap elyssa   • Hepatitis B    • Hepatitis C    • History of heart murmur in childhood    • History of kidney stones    • HSV-1 infection 2017   • Seizures (CMS/HCC)     seizure / caused by lack of sleep per neuro, seen by Dr Concepcion and cleared for surgery          SURGICAL HISTORY  Past Surgical History:   Procedure Laterality Date   • ADENOIDECTOMY     •  SECTION      Twins 36 weeks   • TONSILLECTOMY     • VENTRAL/INCISIONAL HERNIA REPAIR Left 2019    Procedure: EXPLORATION OF THE ABDOMINAL WALL, REMOVAL OF 2.8CM LIPOMA;  Surgeon: Walter Alvarado MD;  Location: [facility] OR;  Service: General   • WISDOM TOOTH EXTRACTION           FAMILY HISTORY  Family History   Problem Relation Age of Onset   • Cervical cancer Mother    • Cervical cancer Maternal Grandmother    • Diabetes Maternal Grandmother    • Brain cancer Other    • Diabetes Other    • Heart failure Other    • Diabetes Other    • Heart failure Other    • Other Sister         SAB   • Heart disease Sister    • Migraines Sister    • Breast cancer Neg Hx    • Ovarian cancer Neg Hx    • Colon cancer Neg Hx          SOCIAL HISTORY  Social History     Occupational History     Employer: UNEMPLOYED   Tobacco Use   • Smoking status: Current Every Day Smoker     Packs/day: 0.25     Years: 10.00     Pack years: 2.50     Types: Cigarettes   • Smokeless tobacco: Never Used   Substance and Sexual Activity   • Alcohol use: No   • Drug use: Yes     Types: Methamphetamines     Comment: history of drug use- last use 8 monthsago   • Sexual activity: Yes     Partners: Male     Birth control/protection: None, Implant       Debilities/Disabilities Identified: None    Emotional Behavior: Appropriate    CURRENT MEDICATIONS    Current Outpatient Medications:   •  levETIRAcetam (KEPPRA) 500 MG tablet, Take 4 tablets by mouth 2 (Two) Times a Day., Disp: 240 tablet, Rfl: 5  •  naproxen (NAPROSYN) 500 MG tablet, Take 1 tablet by mouth 2 (Two) Times a Day As Needed for Mild Pain . Take with meals, Disp: 20 tablet, Rfl: 0  •  NEXPLANON 68 MG implant subdermal implant, Inject 1 each into the appropriate area of the skin as directed by provider., Disp: , Rfl:   •  Perampanel (FYCOMPA) 4 MG tablet, Take 1 tablet at bedtime (Patient  taking differently: Take 2 mg by mouth Every Night. Taking 2 mg for 2 weeks, then goes to 4 mg at bedtime), Disp: 30 tablet, Rfl: 2    ALLERGIES  Sulfa antibiotics    VITALS  There were no vitals filed for this visit.    LAST RESULTS   Admission on 04/21/2019, Discharged on 04/21/2019   Component Date Value Ref Range Status   • HCG, Urine QL 04/21/2019 Negative  Negative Final   • WBC 04/21/2019 9.64  3.40 - 10.80 10*3/mm3 Final   • RBC 04/21/2019 4.44  3.77 - 5.28 10*6/mm3 Final   • Hemoglobin 04/21/2019 12.8  12.0 - 15.9 g/dL Final   • Hematocrit 04/21/2019 39.6  34.0 - 46.6 % Final   • MCV 04/21/2019 89.2  79.0 - 97.0 fL Final   • MCH 04/21/2019 28.8  26.6 - 33.0 pg Final   • MCHC 04/21/2019 32.3  31.5 - 35.7 g/dL Final   • RDW 04/21/2019 13.8  12.3 - 15.4 % Final   • RDW-SD 04/21/2019 45.1  37.0 - 54.0 fl Final   • MPV 04/21/2019 9.8  6.0 - 12.0 fL Final   • Platelets 04/21/2019 320  140 - 450 10*3/mm3 Final   • Neutrophil % 04/21/2019 39.5* 42.7 - 76.0 % Final   • Lymphocyte % 04/21/2019 45.6* 19.6 - 45.3 % Final   • Monocyte % 04/21/2019 9.2  5.0 - 12.0 % Final   • Eosinophil % 04/21/2019 5.2  0.3 - 6.2 % Final   • Basophil % 04/21/2019 0.3  0.0 - 1.5 % Final   • Immature Grans % 04/21/2019 0.2  0.0 - 0.5 % Final   • Neutrophils, Absolute 04/21/2019 3.80  1.70 - 7.00 10*3/mm3 Final   • Lymphocytes, Absolute 04/21/2019 4.40* 0.70 - 3.10 10*3/mm3 Final   • Monocytes, Absolute 04/21/2019 0.89  0.10 - 0.90 10*3/mm3 Final   • Eosinophils, Absolute 04/21/2019 0.50* 0.00 - 0.40 10*3/mm3 Final   • Basophils, Absolute 04/21/2019 0.03  0.00 - 0.20 10*3/mm3 Final   • Immature Grans, Absolute 04/21/2019 0.02  0.00 - 0.05 10*3/mm3 Final   • nRBC 04/21/2019 0.0  0.0 - 0.2 /100 WBC Final   • Extra Tube 04/21/2019 hold for add-on   Final    Auto resulted   • Extra Tube 04/21/2019 Hold for add-ons.   Final    Auto resulted.   • Extra Tube 04/21/2019 hold for add-on   Final    Auto resulted   • Extra Tube 04/21/2019 Hold for  add-ons.   Final    Auto resulted.     No results found.    PHYSICAL EXAM  Physical Exam alert white female in no active distress.  She is oriented x3.  She does not have any clinical jaundice.  Her heart shows regular rate and rhythm.  Her lungs are clear and equal.  Her abdomen is soft and nontender she is no right upper quadrant mass.  She does have a periumbilical hernia and she does have a small seroma in her left lower quadrant wound without evidence of abscess.  Her prior CT scan of the abdomen was reviewed and showed the periumbilical hernia and some nonobstructing renal stones I reviewed the films myself  Prior ultrasound showed cholelithiasis    ASSESSMENT  Seroma  Cholelithiasis  Ventral hernia      PLAN the risk benefits options were discussed with her in detail as far as a seroma goes this should resorb and additional intervention needs to be performed at this time.  As far as the cholelithiasis and the periumbilical hernia we discussed laparoscopic cholecystectomy and repair of her abdominal wall hernia and this has been arranged for [facility] for May 3  Discussed risks of surgery with patient and in particular increased risk of wound infection, poor wound healing, hernias (with abdominal surgery) and post-operative pulmonary complications associated with smoking.

## 2019-04-23 NOTE — PROGRESS NOTES
PATIENT INFORMATION  Cindy Guerin       - 2000    CHIEF COMPLAINT  Chief Complaint   Patient presents with   • Post-op   2 wk PO lipoma removal with abdominal wall exploration - she was seen in [facility] ER on 19 and [facility] ER on 19, states she is still having increased pain and she is completely out of pain medication      HISTORY OF PRESENT ILLNESS  HPI  She went to the emergency room on several occasions this week and complaining of swelling of her left lower quadrant abdominal wall and pain.  She is CT scan performed in [place] that was consistent with a seroma.  She still complains of epigastric and right upper quadrant pain related to her gallstones she denies any fevers or chills or jaundice type symptoms and she complains of pain around her periumbilical hernia.      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]   • Drug abuse during pregnancy (CMS/HCC) [O99.320, F19.10]   • Chronic hepatitis C without hepatic coma (CMS/HCC) [B18.2]   • Chronic viral hepatitis B [B18.1]         PAST MEDICAL HISTORY  Past Medical History:   Diagnosis Date   • Anemia    • Gallstones     sched lap elyssa   • Hepatitis B    • Hepatitis C    • History of heart murmur in childhood    • History of kidney stones    • HSV-1 infection 2017   • Seizures (CMS/HCC)     seizure / caused by lack of sleep per neuro, seen by Dr Concepcion and cleared for surgery          SURGICAL HISTORY  Past Surgical History:   Procedure Laterality Date   • ADENOIDECTOMY     •  SECTION      Twins 36 weeks   • TONSILLECTOMY     • VENTRAL/INCISIONAL HERNIA REPAIR Left 2019    Procedure: EXPLORATION OF THE ABDOMINAL WALL, REMOVAL OF 2.8CM LIPOMA;  Surgeon: Walter Alvarado MD;  Location: [facility] OR;  Service: General   • WISDOM TOOTH EXTRACTION           FAMILY HISTORY  Family History   Problem Relation Age of Onset   • Cervical cancer Mother    • Cervical cancer Maternal Grandmother    • Diabetes Maternal Grandmother    • Brain cancer Other    • Diabetes Other    • Heart failure Other    • Diabetes Other    • Heart failure Other    • Other Sister         SAB   • Heart disease Sister    • Migraines Sister    • Breast cancer Neg Hx    • Ovarian cancer Neg Hx    • Colon cancer Neg Hx          SOCIAL HISTORY  Social History     Occupational History     Employer: UNEMPLOYED   Tobacco Use   • Smoking status: Current Every Day Smoker     Packs/day: 0.25     Years: 10.00     Pack years: 2.50     Types: Cigarettes   • Smokeless tobacco: Never Used   Substance and Sexual Activity   • Alcohol use: No   • Drug use: Yes     Types: Methamphetamines     Comment: history of drug use- last use 8 monthsago   • Sexual activity: Yes     Partners: Male     Birth control/protection: None, Implant       Debilities/Disabilities Identified: None    Emotional Behavior: Appropriate    CURRENT MEDICATIONS    Current Outpatient Medications:   •  levETIRAcetam (KEPPRA) 500 MG tablet, Take 4 tablets by mouth 2 (Two) Times a Day., Disp: 240 tablet, Rfl: 5  •  naproxen (NAPROSYN) 500 MG tablet, Take 1 tablet by mouth 2 (Two) Times a Day As Needed for Mild Pain . Take with meals, Disp: 20 tablet, Rfl: 0  •  NEXPLANON 68 MG implant subdermal implant, Inject 1 each into the appropriate area of the skin as directed by provider., Disp: , Rfl:   •  Perampanel (FYCOMPA) 4 MG tablet, Take 1 tablet at bedtime (Patient  taking differently: Take 2 mg by mouth Every Night. Taking 2 mg for 2 weeks, then goes to 4 mg at bedtime), Disp: 30 tablet, Rfl: 2    ALLERGIES  Sulfa antibiotics    VITALS  There were no vitals filed for this visit.    LAST RESULTS   Admission on 04/21/2019, Discharged on 04/21/2019   Component Date Value Ref Range Status   • HCG, Urine QL 04/21/2019 Negative  Negative Final   • WBC 04/21/2019 9.64  3.40 - 10.80 10*3/mm3 Final   • RBC 04/21/2019 4.44  3.77 - 5.28 10*6/mm3 Final   • Hemoglobin 04/21/2019 12.8  12.0 - 15.9 g/dL Final   • Hematocrit 04/21/2019 39.6  34.0 - 46.6 % Final   • MCV 04/21/2019 89.2  79.0 - 97.0 fL Final   • MCH 04/21/2019 28.8  26.6 - 33.0 pg Final   • MCHC 04/21/2019 32.3  31.5 - 35.7 g/dL Final   • RDW 04/21/2019 13.8  12.3 - 15.4 % Final   • RDW-SD 04/21/2019 45.1  37.0 - 54.0 fl Final   • MPV 04/21/2019 9.8  6.0 - 12.0 fL Final   • Platelets 04/21/2019 320  140 - 450 10*3/mm3 Final   • Neutrophil % 04/21/2019 39.5* 42.7 - 76.0 % Final   • Lymphocyte % 04/21/2019 45.6* 19.6 - 45.3 % Final   • Monocyte % 04/21/2019 9.2  5.0 - 12.0 % Final   • Eosinophil % 04/21/2019 5.2  0.3 - 6.2 % Final   • Basophil % 04/21/2019 0.3  0.0 - 1.5 % Final   • Immature Grans % 04/21/2019 0.2  0.0 - 0.5 % Final   • Neutrophils, Absolute 04/21/2019 3.80  1.70 - 7.00 10*3/mm3 Final   • Lymphocytes, Absolute 04/21/2019 4.40* 0.70 - 3.10 10*3/mm3 Final   • Monocytes, Absolute 04/21/2019 0.89  0.10 - 0.90 10*3/mm3 Final   • Eosinophils, Absolute 04/21/2019 0.50* 0.00 - 0.40 10*3/mm3 Final   • Basophils, Absolute 04/21/2019 0.03  0.00 - 0.20 10*3/mm3 Final   • Immature Grans, Absolute 04/21/2019 0.02  0.00 - 0.05 10*3/mm3 Final   • nRBC 04/21/2019 0.0  0.0 - 0.2 /100 WBC Final   • Extra Tube 04/21/2019 hold for add-on   Final    Auto resulted   • Extra Tube 04/21/2019 Hold for add-ons.   Final    Auto resulted.   • Extra Tube 04/21/2019 hold for add-on   Final    Auto resulted   • Extra Tube 04/21/2019 Hold for  add-ons.   Final    Auto resulted.     No results found.    PHYSICAL EXAM  Physical Exam alert white female in no active distress.  She is oriented x3.  She does not have any clinical jaundice.  Her heart shows regular rate and rhythm.  Her lungs are clear and equal.  Her abdomen is soft and nontender she is no right upper quadrant mass.  She does have a periumbilical hernia and she does have a small seroma in her left lower quadrant wound without evidence of abscess.  Her prior CT scan of the abdomen was reviewed and showed the periumbilical hernia and some nonobstructing renal stones I reviewed the films myself  Prior ultrasound showed cholelithiasis    ASSESSMENT  Seroma  Cholelithiasis  Ventral hernia      PLAN the risk benefits options were discussed with her in detail as far as a seroma goes this should resorb and additional intervention needs to be performed at this time.  As far as the cholelithiasis and the periumbilical hernia we discussed laparoscopic cholecystectomy and repair of her abdominal wall hernia and this has been arranged for [facility] for May 3  Discussed risks of surgery with patient and in particular increased risk of wound infection, poor wound healing, hernias (with abdominal surgery) and post-operative pulmonary complications associated with smoking.

## 2019-04-24 ENCOUNTER — APPOINTMENT (OUTPATIENT)
Dept: NEUROLOGY | Facility: HOSPITAL | Age: 19
End: 2019-04-24

## 2019-04-24 ENCOUNTER — APPOINTMENT (OUTPATIENT)
Dept: MRI IMAGING | Facility: HOSPITAL | Age: 19
End: 2019-04-24

## 2019-04-29 ENCOUNTER — OFFICE VISIT (OUTPATIENT)
Dept: SURGERY | Facility: CLINIC | Age: 19
End: 2019-04-29

## 2019-04-29 ENCOUNTER — LAB (OUTPATIENT)
Dept: LAB | Facility: HOSPITAL | Age: 19
End: 2019-04-29

## 2019-04-29 DIAGNOSIS — S30.1XXD ABDOMINAL WALL SEROMA, SUBSEQUENT ENCOUNTER: Primary | ICD-10-CM

## 2019-04-29 DIAGNOSIS — K80.20 CALCULUS OF GALLBLADDER WITHOUT CHOLECYSTITIS WITHOUT OBSTRUCTION: ICD-10-CM

## 2019-04-29 LAB
ALBUMIN SERPL-MCNC: 4.2 G/DL (ref 3.5–5.2)
ALBUMIN/GLOB SERPL: 1.4 G/DL
ALP SERPL-CCNC: 83 U/L (ref 39–117)
ALT SERPL W P-5'-P-CCNC: 29 U/L (ref 1–33)
ANION GAP SERPL CALCULATED.3IONS-SCNC: 13.7 MMOL/L
AST SERPL-CCNC: 25 U/L (ref 1–32)
BILIRUB SERPL-MCNC: 0.2 MG/DL (ref 0.2–1.2)
BUN BLD-MCNC: 7 MG/DL (ref 6–20)
BUN/CREAT SERPL: 11.9 (ref 7–25)
CALCIUM SPEC-SCNC: 9.2 MG/DL (ref 8.6–10.5)
CHLORIDE SERPL-SCNC: 105 MMOL/L (ref 98–107)
CO2 SERPL-SCNC: 24.3 MMOL/L (ref 22–29)
CREAT BLD-MCNC: 0.59 MG/DL (ref 0.57–1)
GFR SERPL CREATININE-BSD FRML MDRD: 130 ML/MIN/1.73
GLOBULIN UR ELPH-MCNC: 2.9 GM/DL
GLUCOSE BLD-MCNC: 108 MG/DL (ref 65–99)
HCG SERPL QL: NEGATIVE
POTASSIUM BLD-SCNC: 4 MMOL/L (ref 3.5–5.2)
PROT SERPL-MCNC: 7.1 G/DL (ref 6–8.5)
SODIUM BLD-SCNC: 143 MMOL/L (ref 136–145)

## 2019-04-29 PROCEDURE — 80053 COMPREHEN METABOLIC PANEL: CPT

## 2019-04-29 PROCEDURE — 99024 POSTOP FOLLOW-UP VISIT: CPT | Performed by: SURGERY

## 2019-04-29 PROCEDURE — 84703 CHORIONIC GONADOTROPIN ASSAY: CPT

## 2019-04-29 PROCEDURE — 36415 COLL VENOUS BLD VENIPUNCTURE: CPT

## 2019-04-29 NOTE — PROGRESS NOTES
3 wk PO exploration of abdominal wall excision of lipoma - knot at surgery site and states it is larger in size since last week associated with pain  She says the seroma is increasing in size.  She called my service yesterday and I spoke with her.  The site is slightly larger.  There is no cellulitis or evidence of abscess.  The risks benefits and options were discussed with her in detail.  The site was prepped aspirated with an 18-gauge needle and 60 cc of serous fluid was returned.  The cavity was felt to be completely collapsed.  She tolerated the procedure well.  She can proceed with her gallbladder surgery on Friday.

## 2019-05-02 ENCOUNTER — ANESTHESIA EVENT (OUTPATIENT)
Dept: PERIOP | Facility: HOSPITAL | Age: 19
End: 2019-05-02

## 2019-05-03 ENCOUNTER — HOSPITAL ENCOUNTER (OUTPATIENT)
Facility: HOSPITAL | Age: 19
Setting detail: HOSPITAL OUTPATIENT SURGERY
Discharge: HOME | End: 2019-05-03
Attending: SURGERY | Admitting: SURGERY

## 2019-05-03 ENCOUNTER — ANESTHESIA (OUTPATIENT)
Dept: PERIOP | Facility: HOSPITAL | Age: 19
End: 2019-05-03

## 2019-05-03 VITALS
TEMPERATURE: 97.8 F | BODY MASS INDEX: 29.31 KG/M2 | WEIGHT: 145.4 LBS | SYSTOLIC BLOOD PRESSURE: 122 MMHG | HEIGHT: 59 IN | OXYGEN SATURATION: 94 % | HEART RATE: 82 BPM | DIASTOLIC BLOOD PRESSURE: 82 MMHG | RESPIRATION RATE: 18 BRPM

## 2019-05-03 DIAGNOSIS — K80.20 CALCULUS OF GALLBLADDER WITHOUT CHOLECYSTITIS WITHOUT OBSTRUCTION: ICD-10-CM

## 2019-05-03 DIAGNOSIS — K43.9 VENTRAL HERNIA WITHOUT OBSTRUCTION OR GANGRENE: ICD-10-CM

## 2019-05-03 PROCEDURE — 25010000002 KETOROLAC TROMETHAMINE PER 15 MG: Performed by: NURSE ANESTHETIST, CERTIFIED REGISTERED

## 2019-05-03 PROCEDURE — 47562 LAPAROSCOPIC CHOLECYSTECTOMY: CPT | Performed by: SURGERY

## 2019-05-03 PROCEDURE — 25010000002 NEOSTIGMINE PER 0.5 MG: Performed by: NURSE ANESTHETIST, CERTIFIED REGISTERED

## 2019-05-03 PROCEDURE — 25010000002 MIDAZOLAM PER 1 MG: Performed by: NURSE ANESTHETIST, CERTIFIED REGISTERED

## 2019-05-03 PROCEDURE — 25010000003 CEFAZOLIN SODIUM-DEXTROSE 2-3 GM-%(50ML) RECONSTITUTED SOLUTION: Performed by: NURSE ANESTHETIST, CERTIFIED REGISTERED

## 2019-05-03 PROCEDURE — 88302 TISSUE EXAM BY PATHOLOGIST: CPT | Performed by: SURGERY

## 2019-05-03 PROCEDURE — 25010000002 PROPOFOL 10 MG/ML EMULSION: Performed by: NURSE ANESTHETIST, CERTIFIED REGISTERED

## 2019-05-03 PROCEDURE — 25010000002 SUCCINYLCHOLINE PER 20 MG: Performed by: NURSE ANESTHETIST, CERTIFIED REGISTERED

## 2019-05-03 PROCEDURE — 25010000002 ONDANSETRON PER 1 MG: Performed by: NURSE ANESTHETIST, CERTIFIED REGISTERED

## 2019-05-03 PROCEDURE — 88304 TISSUE EXAM BY PATHOLOGIST: CPT | Performed by: SURGERY

## 2019-05-03 PROCEDURE — 25010000002 HYDROMORPHONE 1 MG/ML SOLUTION: Performed by: NURSE ANESTHETIST, CERTIFIED REGISTERED

## 2019-05-03 PROCEDURE — 25010000002 FENTANYL CITRATE (PF) 100 MCG/2ML SOLUTION: Performed by: NURSE ANESTHETIST, CERTIFIED REGISTERED

## 2019-05-03 PROCEDURE — 25010000002 DEXAMETHASONE PER 1 MG: Performed by: NURSE ANESTHETIST, CERTIFIED REGISTERED

## 2019-05-03 RX ORDER — DEXAMETHASONE SODIUM PHOSPHATE 4 MG/ML
8 INJECTION, SOLUTION INTRA-ARTICULAR; INTRALESIONAL; INTRAMUSCULAR; INTRAVENOUS; SOFT TISSUE ONCE AS NEEDED
Status: COMPLETED | OUTPATIENT
Start: 2019-05-03 | End: 2019-05-03

## 2019-05-03 RX ORDER — CEFAZOLIN SODIUM 2 G/50ML
SOLUTION INTRAVENOUS AS NEEDED
Status: DISCONTINUED | OUTPATIENT
Start: 2019-05-03 | End: 2019-05-03 | Stop reason: SURG

## 2019-05-03 RX ORDER — MAGNESIUM HYDROXIDE 1200 MG/15ML
LIQUID ORAL AS NEEDED
Status: DISCONTINUED | OUTPATIENT
Start: 2019-05-03 | End: 2019-05-03 | Stop reason: HOSPADM

## 2019-05-03 RX ORDER — FENTANYL CITRATE 50 UG/ML
INJECTION, SOLUTION INTRAMUSCULAR; INTRAVENOUS AS NEEDED
Status: DISCONTINUED | OUTPATIENT
Start: 2019-05-03 | End: 2019-05-03 | Stop reason: SURG

## 2019-05-03 RX ORDER — LIDOCAINE HYDROCHLORIDE 20 MG/ML
INJECTION, SOLUTION INFILTRATION; PERINEURAL AS NEEDED
Status: DISCONTINUED | OUTPATIENT
Start: 2019-05-03 | End: 2019-05-03 | Stop reason: SURG

## 2019-05-03 RX ORDER — SUCCINYLCHOLINE CHLORIDE 20 MG/ML
INJECTION INTRAMUSCULAR; INTRAVENOUS AS NEEDED
Status: DISCONTINUED | OUTPATIENT
Start: 2019-05-03 | End: 2019-05-03 | Stop reason: SURG

## 2019-05-03 RX ORDER — MIDAZOLAM HYDROCHLORIDE 1 MG/ML
2 INJECTION INTRAMUSCULAR; INTRAVENOUS
Status: DISCONTINUED | OUTPATIENT
Start: 2019-05-03 | End: 2019-05-03 | Stop reason: HOSPADM

## 2019-05-03 RX ORDER — SODIUM CHLORIDE 0.9 % (FLUSH) 0.9 %
1-10 SYRINGE (ML) INJECTION AS NEEDED
Status: DISCONTINUED | OUTPATIENT
Start: 2019-05-03 | End: 2019-05-03 | Stop reason: HOSPADM

## 2019-05-03 RX ORDER — SODIUM CHLORIDE 0.9 % (FLUSH) 0.9 %
3 SYRINGE (ML) INJECTION EVERY 12 HOURS SCHEDULED
Status: DISCONTINUED | OUTPATIENT
Start: 2019-05-03 | End: 2019-05-03 | Stop reason: HOSPADM

## 2019-05-03 RX ORDER — OXYCODONE AND ACETAMINOPHEN 7.5; 325 MG/1; MG/1
2 TABLET ORAL EVERY 4 HOURS PRN
Status: DISCONTINUED | OUTPATIENT
Start: 2019-05-03 | End: 2019-05-03 | Stop reason: HOSPADM

## 2019-05-03 RX ORDER — MEPERIDINE HYDROCHLORIDE 25 MG/ML
12.5 INJECTION INTRAMUSCULAR; INTRAVENOUS; SUBCUTANEOUS
Status: DISCONTINUED | OUTPATIENT
Start: 2019-05-03 | End: 2019-05-03 | Stop reason: HOSPADM

## 2019-05-03 RX ORDER — ONDANSETRON 2 MG/ML
4 INJECTION INTRAMUSCULAR; INTRAVENOUS ONCE AS NEEDED
Status: DISCONTINUED | OUTPATIENT
Start: 2019-05-03 | End: 2019-05-03 | Stop reason: HOSPADM

## 2019-05-03 RX ORDER — CEFAZOLIN SODIUM 2 G/50ML
2 SOLUTION INTRAVENOUS ONCE
Status: DISCONTINUED | OUTPATIENT
Start: 2019-05-03 | End: 2019-05-03 | Stop reason: HOSPADM

## 2019-05-03 RX ORDER — KETOROLAC TROMETHAMINE 30 MG/ML
INJECTION, SOLUTION INTRAMUSCULAR; INTRAVENOUS AS NEEDED
Status: DISCONTINUED | OUTPATIENT
Start: 2019-05-03 | End: 2019-05-03 | Stop reason: SURG

## 2019-05-03 RX ORDER — BUPIVACAINE HYDROCHLORIDE AND EPINEPHRINE 2.5; 5 MG/ML; UG/ML
INJECTION, SOLUTION INFILTRATION; PERINEURAL AS NEEDED
Status: DISCONTINUED | OUTPATIENT
Start: 2019-05-03 | End: 2019-05-03 | Stop reason: HOSPADM

## 2019-05-03 RX ORDER — SODIUM CHLORIDE, SODIUM LACTATE, POTASSIUM CHLORIDE, CALCIUM CHLORIDE 600; 310; 30; 20 MG/100ML; MG/100ML; MG/100ML; MG/100ML
9 INJECTION, SOLUTION INTRAVENOUS CONTINUOUS
Status: DISCONTINUED | OUTPATIENT
Start: 2019-05-03 | End: 2019-05-03 | Stop reason: HOSPADM

## 2019-05-03 RX ORDER — LIDOCAINE HYDROCHLORIDE 10 MG/ML
0.5 INJECTION, SOLUTION EPIDURAL; INFILTRATION; INTRACAUDAL; PERINEURAL ONCE AS NEEDED
Status: COMPLETED | OUTPATIENT
Start: 2019-05-03 | End: 2019-05-03

## 2019-05-03 RX ORDER — SODIUM CHLORIDE 9 MG/ML
40 INJECTION, SOLUTION INTRAVENOUS AS NEEDED
Status: DISCONTINUED | OUTPATIENT
Start: 2019-05-03 | End: 2019-05-03 | Stop reason: HOSPADM

## 2019-05-03 RX ORDER — SODIUM CHLORIDE, SODIUM LACTATE, POTASSIUM CHLORIDE, CALCIUM CHLORIDE 600; 310; 30; 20 MG/100ML; MG/100ML; MG/100ML; MG/100ML
100 INJECTION, SOLUTION INTRAVENOUS CONTINUOUS
Status: DISCONTINUED | OUTPATIENT
Start: 2019-05-03 | End: 2019-05-03 | Stop reason: HOSPADM

## 2019-05-03 RX ORDER — ONDANSETRON 2 MG/ML
4 INJECTION INTRAMUSCULAR; INTRAVENOUS ONCE AS NEEDED
Status: COMPLETED | OUTPATIENT
Start: 2019-05-03 | End: 2019-05-03

## 2019-05-03 RX ORDER — OXYCODONE HYDROCHLORIDE AND ACETAMINOPHEN 5; 325 MG/1; MG/1
1 TABLET ORAL ONCE AS NEEDED
Status: COMPLETED | OUTPATIENT
Start: 2019-05-03 | End: 2019-05-03

## 2019-05-03 RX ORDER — OXYCODONE HYDROCHLORIDE AND ACETAMINOPHEN 5; 325 MG/1; MG/1
1-2 TABLET ORAL EVERY 4 HOURS PRN
Qty: 30 TABLET | Refills: 0 | Status: SHIPPED | OUTPATIENT
Start: 2019-05-03 | End: 2019-05-08 | Stop reason: SDUPTHER

## 2019-05-03 RX ORDER — MIDAZOLAM HYDROCHLORIDE 1 MG/ML
1 INJECTION INTRAMUSCULAR; INTRAVENOUS
Status: DISCONTINUED | OUTPATIENT
Start: 2019-05-03 | End: 2019-05-03 | Stop reason: HOSPADM

## 2019-05-03 RX ORDER — GLYCOPYRROLATE 0.2 MG/ML
INJECTION INTRAMUSCULAR; INTRAVENOUS AS NEEDED
Status: DISCONTINUED | OUTPATIENT
Start: 2019-05-03 | End: 2019-05-03 | Stop reason: SURG

## 2019-05-03 RX ORDER — ROCURONIUM BROMIDE 10 MG/ML
INJECTION, SOLUTION INTRAVENOUS AS NEEDED
Status: DISCONTINUED | OUTPATIENT
Start: 2019-05-03 | End: 2019-05-03 | Stop reason: SURG

## 2019-05-03 RX ORDER — PROPOFOL 10 MG/ML
VIAL (ML) INTRAVENOUS AS NEEDED
Status: DISCONTINUED | OUTPATIENT
Start: 2019-05-03 | End: 2019-05-03 | Stop reason: SURG

## 2019-05-03 RX ADMIN — SUCCINYLCHOLINE CHLORIDE 100 MG: 20 INJECTION, SOLUTION INTRAMUSCULAR; INTRAVENOUS at 09:12

## 2019-05-03 RX ADMIN — DEXAMETHASONE SODIUM PHOSPHATE 8 MG: 4 INJECTION, SOLUTION INTRA-ARTICULAR; INTRALESIONAL; INTRAMUSCULAR; INTRAVENOUS; SOFT TISSUE at 08:51

## 2019-05-03 RX ADMIN — LIDOCAINE HYDROCHLORIDE 0.1 ML: 10 INJECTION, SOLUTION EPIDURAL; INFILTRATION; INTRACAUDAL; PERINEURAL at 07:38

## 2019-05-03 RX ADMIN — SODIUM CHLORIDE, POTASSIUM CHLORIDE, SODIUM LACTATE AND CALCIUM CHLORIDE: 600; 310; 30; 20 INJECTION, SOLUTION INTRAVENOUS at 08:38

## 2019-05-03 RX ADMIN — FENTANYL CITRATE 50 MCG: 50 INJECTION, SOLUTION INTRAMUSCULAR; INTRAVENOUS at 09:12

## 2019-05-03 RX ADMIN — HYDROMORPHONE HYDROCHLORIDE 1 MG: 1 INJECTION, SOLUTION INTRAMUSCULAR; INTRAVENOUS; SUBCUTANEOUS at 10:51

## 2019-05-03 RX ADMIN — FENTANYL CITRATE 50 MCG: 50 INJECTION, SOLUTION INTRAMUSCULAR; INTRAVENOUS at 09:50

## 2019-05-03 RX ADMIN — FAMOTIDINE 20 MG: 10 INJECTION, SOLUTION INTRAVENOUS at 08:50

## 2019-05-03 RX ADMIN — ROCURONIUM BROMIDE 30 MG: 10 INJECTION INTRAVENOUS at 09:14

## 2019-05-03 RX ADMIN — FENTANYL CITRATE 50 MCG: 50 INJECTION, SOLUTION INTRAMUSCULAR; INTRAVENOUS at 09:40

## 2019-05-03 RX ADMIN — SODIUM CHLORIDE, POTASSIUM CHLORIDE, SODIUM LACTATE AND CALCIUM CHLORIDE 9 ML/HR: 600; 310; 30; 20 INJECTION, SOLUTION INTRAVENOUS at 07:39

## 2019-05-03 RX ADMIN — NEOSTIGMINE METHYLSULFATE 4 MG: 1 INJECTION, SOLUTION INTRAMUSCULAR; INTRAVENOUS; SUBCUTANEOUS at 10:18

## 2019-05-03 RX ADMIN — CEFAZOLIN SODIUM 2 G: 2 SOLUTION INTRAVENOUS at 09:11

## 2019-05-03 RX ADMIN — LIDOCAINE HYDROCHLORIDE 100 MG: 20 INJECTION, SOLUTION INFILTRATION; PERINEURAL at 09:12

## 2019-05-03 RX ADMIN — KETOROLAC TROMETHAMINE 30 MG: 30 INJECTION INTRAMUSCULAR; INTRAVENOUS at 10:15

## 2019-05-03 RX ADMIN — PROPOFOL 150 MG: 10 INJECTION, EMULSION INTRAVENOUS at 09:12

## 2019-05-03 RX ADMIN — ONDANSETRON 4 MG: 2 INJECTION, SOLUTION INTRAMUSCULAR; INTRAVENOUS at 08:50

## 2019-05-03 RX ADMIN — GLYCOPYRROLATE 0.4 MG: 0.2 INJECTION INTRAMUSCULAR; INTRAVENOUS at 10:18

## 2019-05-03 RX ADMIN — MIDAZOLAM HYDROCHLORIDE 1 MG: 1 INJECTION, SOLUTION INTRAMUSCULAR; INTRAVENOUS at 08:55

## 2019-05-03 RX ADMIN — OXYCODONE HYDROCHLORIDE AND ACETAMINOPHEN 1 TABLET: 5; 325 TABLET ORAL at 11:13

## 2019-05-03 NOTE — ANESTHESIA PREPROCEDURE EVALUATION
" Anesthesia Evaluation     Patient summary reviewed and Nursing notes reviewed   no history of anesthetic complications:  NPO Solid Status: > 8 hours  NPO Liquid Status: > 2 hours           Airway   Mallampati: I  TM distance: >3 FB  Neck ROM: full  No difficulty expected  Dental - normal exam     Pulmonary - normal exam    breath sounds clear to auscultation  (+) a smoker (one half pack day) Current Smoked day of surgery, decreased breath sounds,   Cardiovascular - normal exam  Exercise tolerance: good (4-7 METS)    ECG reviewed  Rhythm: regular  Rate: normal      ROS comment: Reading physician: Teresa Ann MD Ordering physician: Fawn Berry MD Study date: 3/14/19  Patient Information     Patient Name  Cindy Guerin MRN  [id number] Sex  Female  (Age)  2000 (19 y.o.)  Interpretation Summary     · Negative clinical evidence of myocardial ischemia. Findings consistent with a normal ECG stress test.    Patient Name  Cindy Guerin MRN  [id number] Sex  Female  (Age)  2000 (19 y.o.)  Sedation Narrator Report     Sedation Narrator Report  Interpretation Summary     · Mild mitral valve regurgitation is present  · Mild tricuspid valve regurgitation is present.  · Calculated EF = 56.0%.  · There is no evidence of pericardial effusion.    Fawn Berry MD     2018  8:35 AM    ECG 12 Lead  Date/Time: 2018 2:38 PM  Performed by: FAWN BERRY  Authorized by: AFWN BERRY   Comparison: not compared with previous ECG   Previous ECG: no previous ECG available  Rhythm: sinus rhythm  ST Flattening: all    Neuro/Psych  (+) seizures (last seizure 2019 neuro said due to lack of sleep) well controlled,     GI/Hepatic/Renal/Endo    (+)   hepatitis (patient states B \"went away\") B and C, renal disease (in past) stones,     Musculoskeletal (-) negative ROS    Abdominal  - normal exam   Substance History - negative use  Drug use: denies.     OB/GYN negative ob/gyn " ROS         Other - negative ROS                No changes since last anesthesia           Anesthesia Plan    ASA 2     general     intravenous induction   Anesthetic plan, all risks, benefits, and alternatives have been provided, discussed and informed consent has been obtained with: patient and spouse/significant other.  Use of blood products discussed with patient  Consented to blood products.

## 2019-05-03 NOTE — OP NOTE
Preoperative diagnosis cholelithiasis, incisional hernia  Postoperative diagnosis the same  Procedure laparoscopic cholecystectomy and repair of incisional hernia  Complications none  Drains none  Specimen hernia sac to pathology  Estimated blood loss 15 cc  Anesthesia General endotracheal tube  Surgeon Dr. Alvarado  Assistant Glo Collado  Findings adhesions of the gallbladder, small supraumbilical incisional hernia, possible endometrial implant  Procedure after satisfactory induction general anesthesia via endotracheal tube the patient's abdomen was prepped in sterile fashion.  Vertical midline skin incision was made just above the navel carried down through skin and subcutaneous tissue.  Sac was encountered was dissected out.  The sac was divided along its base and she had a small fascial defect.  Specimen was passed off.  Fascia was controlled medially and laterally between 0 Ethibond stay sutures.  Michael trocar was placed within the abdomen under direct vision.  Abdomen was insufflated to 14 mmHg with use CO2.  General survey of the abdomen showed a small implant on the anterior abdominal wall in the pelvis was possibly a small implant of endometriosis.  She had multiple adhesions of the gallbladder and to the liver.  5 mm ports x3 were placed one in the epigastrium 2 in the right upper quadrant under direct vision after each site of been locally infiltrated quarter percent Marcaine with epinephrine.  Gallbladder was grasped adhesions to the gallbladder were taken down sharply.  Next the gallbladder dissected out at its junction with the cystic duct.  Cystic duct was dissected out cystic artery was dissected out.  Cystic duct was clipped x3 and divided leaving 2 clips on the cystic duct stump.  Cystic artery was clipped x3 and divided leaving 2 clips on the cystic artery stump.  Several small veins were hemoclipped as well.  Gallbladder was taken out of the liver bed with electrocautery.  Gallbladder was dropped into  an Endo Catch bag removed and the abdomen was inspected found to be divided at the neck letter cystic duct junction.  Right upper quadrant again visualized hemostasis was assured.  5 mm ports were sequentially pulled back all were hemostatic.  Abdomen was desufflated Michael trocar was removed.  Fascial defect was closed in interrupted simple fashion with use of 0 Ethibond placing all sutures and tying at completion.  Skin was closed with 4-0 Monocryl running subcuticular stitch burying the knots.  Wounds were clean and dry and sterilely dressed.  The patient tolerated procedure well was transferred to the recovery room in stable condition.  When she is awake alert stable tolerating p.o. and has voided she will be discharged home to follow-up in my office in 10 days to call for problems.  Diet as tolerated.  She was given a prescription for Percocet 5/325 1-2 p.o. every 4 hours as needed pain 30 these were dispensed without refills.  She should call for problems and call for an appointment

## 2019-05-03 NOTE — ANESTHESIA POSTPROCEDURE EVALUATION
Patient: Cindy Guerin    Procedure Summary     Date:  05/03/19 Room / Location:  [facility] OR 4 /  [facility] OR    Anesthesia Start:  0905 Anesthesia Stop:  1035    Procedure:  CHOLECYSTECTOMY LAPAROSCOPIC, repair incision  hernia (N/A Abdomen) Diagnosis:       Calculus of gallbladder without cholecystitis without obstruction      Ventral hernia without obstruction or gangrene      (Calculus of gallbladder without cholecystitis without obstruction [K80.20])      (Ventral hernia without obstruction or gangrene [K43.9])    Surgeon:  Walter Alvarado MD Provider:  Julissa Cleveland CRNA    Anesthesia Type:  general ASA Status:  2          Anesthesia Type: general  Last vitals  BP   114/74 (05/03/19 1120)   Temp   97.8 °F (36.6 °C) (05/03/19 1043)   Pulse   74 (05/03/19 1120)   Resp   10 (05/03/19 1120)     SpO2   97 % (05/03/19 1120)     Post Anesthesia Care and Evaluation    Patient location during evaluation: bedside  Patient participation: complete - patient participated  Level of consciousness: awake and alert  Pain score: 0  Pain management: adequate  Airway patency: patent  Anesthetic complications: No anesthetic complications  PONV Status: none  Cardiovascular status: acceptable  Respiratory status: acceptable  Hydration status: acceptable    Comments: DC prior to anes eval

## 2019-05-03 NOTE — INTERVAL H&P NOTE
"  H&P updated. The patient was examined and the following changes are noted:  vs  /69   Pulse 80   Temp 98.2 °F (36.8 °C) (Oral)   Resp 15   Ht 149.9 cm (59\")   Wt 66 kg (145 lb 6.4 oz)   LMP 05/03/2019   SpO2 96%   BMI 29.37 kg/m²         "

## 2019-05-03 NOTE — ANESTHESIA PROCEDURE NOTES
Airway  Urgency: elective    Airway not difficult    General Information and Staff    Patient location during procedure: OR  CRNA: Julissa Cleveland CRNA    Indications and Patient Condition  Indications for airway management: airway protection    Preoxygenated: yes  MILS maintained throughout  Mask difficulty assessment: 0 - not attempted    Final Airway Details  Final airway type: endotracheal airway      Successful airway: ETT  Cuffed: yes   Successful intubation technique: video laryngoscopy  Facilitating devices/methods: cricoid pressure and intubating stylet  Endotracheal tube insertion site: oral  Blade: CMAC  Blade size: 3  ETT size (mm): 7.0  Cormack-Lehane Classification: grade I - full view of glottis  Placement verified by: chest auscultation and capnometry   Cuff volume (mL): 8  Measured from: lips  ETT to lips (cm): 19  Number of attempts at approach: 1

## 2019-05-06 ENCOUNTER — OFFICE VISIT (OUTPATIENT)
Dept: SURGERY | Facility: CLINIC | Age: 19
End: 2019-05-06

## 2019-05-06 ENCOUNTER — TELEPHONE (OUTPATIENT)
Dept: SURGERY | Facility: CLINIC | Age: 19
End: 2019-05-06

## 2019-05-06 DIAGNOSIS — Z09 SURGICAL FOLLOW-UP CARE: Primary | ICD-10-CM

## 2019-05-06 LAB
CYTO UR: NORMAL
LAB AP CASE REPORT: NORMAL
PATH REPORT.FINAL DX SPEC: NORMAL
PATH REPORT.GROSS SPEC: NORMAL

## 2019-05-06 PROCEDURE — 99024 POSTOP FOLLOW-UP VISIT: CPT | Performed by: SURGERY

## 2019-05-06 NOTE — TELEPHONE ENCOUNTER
Patient called requesting stronger pain because Oxycodone-apap is NOT helping her pain.    She also states she was in the ER Friday night with her twins and that they require 24 hour supervision for a while.    I spoke to Dr. Alvarado and he states she needs an appointment so she requested to be seen today and I scheduled her at 3:30 pm

## 2019-05-06 NOTE — PROGRESS NOTES
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  And has been down a little and has been picking up her children.  She complains of pain at the incision site.  She is eating well.  She does not have any clinical jaundice.  Her incisions are healing well.  She says she has been taking 2 Percocet every 4 hours for pain.  She also says she is been taking anti-inflammatory as well but she does not know what that medication is.  She will go home and look at that medication and give us a call.  I have written her prescription for Toradol 20 mg x 1 then 1 10 mg pill p.o. every 6 hours as needed pain 20 were dispensed without refills.  We will advise her when she calls with the other medication whether to get the Toradol filled.  She should keep her regularly scheduled appointment.  There is no impulse.  Her pathology showed chronic cholecystitis

## 2019-05-08 ENCOUNTER — APPOINTMENT (OUTPATIENT)
Dept: MRI IMAGING | Facility: HOSPITAL | Age: 19
End: 2019-05-08

## 2019-05-08 ENCOUNTER — APPOINTMENT (OUTPATIENT)
Dept: NEUROLOGY | Facility: HOSPITAL | Age: 19
End: 2019-05-08

## 2019-05-08 RX ORDER — OXYCODONE HYDROCHLORIDE AND ACETAMINOPHEN 5; 325 MG/1; MG/1
1 TABLET ORAL EVERY 4 HOURS PRN
Qty: 30 TABLET | Refills: 0 | Status: SHIPPED | OUTPATIENT
Start: 2019-05-08 | End: 2019-06-04

## 2019-05-13 ENCOUNTER — OFFICE VISIT (OUTPATIENT)
Dept: SURGERY | Facility: CLINIC | Age: 19
End: 2019-05-13

## 2019-05-13 DIAGNOSIS — Z09 SURGICAL FOLLOW-UP CARE: Primary | ICD-10-CM

## 2019-05-13 PROCEDURE — 99024 POSTOP FOLLOW-UP VISIT: CPT | Performed by: SURGERY

## 2019-05-13 RX ORDER — KETOROLAC TROMETHAMINE 10 MG/1
10 TABLET, FILM COATED ORAL EVERY 6 HOURS PRN
COMMUNITY
End: 2019-06-04

## 2019-05-13 NOTE — PROGRESS NOTES
1 1/2 wk PO lap elyssa w/hernia repair - states she is still having severe pain, she is out of Oxycodone-apap and states she thinks she is immune to that and that is why it isn't helping  She has several more Toradol to go and is out of Percocet.  She is complains of ongoing pain adjacent to the incisions.  Incisions are healing well.  She has no clinical jaundice.  There is no impulse.  She should finish out the Toradol.  I have written her prescription for Dilaudid 2 mg p.o. every 4 hours as needed pain 20 these were dispensed without refills and told her that she should not require any further narcotics after this prescription.  I will see her back in 2 weeks

## 2019-05-22 ENCOUNTER — TELEPHONE (OUTPATIENT)
Dept: NEUROLOGY | Facility: CLINIC | Age: 19
End: 2019-05-22

## 2019-05-22 ENCOUNTER — APPOINTMENT (OUTPATIENT)
Dept: MRI IMAGING | Facility: HOSPITAL | Age: 19
End: 2019-05-22

## 2019-05-22 NOTE — TELEPHONE ENCOUNTER
I called her:    SHe is on fycompa 4mg--slept better, and no AE. Off of trileptal.  She ran out of Fycompa for a week and could not sleep but now is back on it.  Has a full 30-day supply.      She wants me to reschedule the brain MRI for [place] because of her travel issues.  And I agreed to do so.    Had GB surgery did well.    I recommended that in 1 month, after she finishes up the current bottle of Fycompa, she will increase to 6 mg at night.

## 2019-05-24 ENCOUNTER — TELEPHONE (OUTPATIENT)
Dept: NEUROLOGY | Facility: CLINIC | Age: 19
End: 2019-05-24

## 2019-05-27 NOTE — TELEPHONE ENCOUNTER
She called this evening.  The doses of keppra and fycompa have not changed in 5 wks. She had successful cholecystectomy.   Today she had odd, 'high feeling' when she got up 'as if she was fighting off a seizure'. No seizure noted by boyfriend. No seizures since starting Fycompa, currently at 4mg hs.  No fever chils, or other problems  Did not accidentally take extra pills of either type. No new meds from anyone else.     So was it an aura or too much meds?  Still on keprra 2000 bid with the fycompa.    Rec- hold one dose of keppra tonight. Tomorrow, reduce keppra to 1500 bid.  When she feels better I wojuld like to increase fycompa to 6mg hs slowly.  There are no AE of fycompa.

## 2019-05-30 ENCOUNTER — TELEPHONE (OUTPATIENT)
Dept: SURGERY | Facility: CLINIC | Age: 19
End: 2019-05-30

## 2019-05-31 DIAGNOSIS — R10.9 ABDOMINAL PAIN, UNSPECIFIED ABDOMINAL LOCATION: Primary | ICD-10-CM

## 2019-05-31 RX ORDER — IBUPROFEN 800 MG/1
800 TABLET ORAL EVERY 8 HOURS PRN
Status: DISCONTINUED | OUTPATIENT
Start: 2019-05-31 | End: 2019-05-31

## 2019-05-31 RX ORDER — IBUPROFEN 800 MG/1
800 TABLET ORAL EVERY 8 HOURS PRN
Qty: 21 TABLET | Refills: 0 | Status: SHIPPED | OUTPATIENT
Start: 2019-05-31 | End: 2019-06-04

## 2019-06-03 ENCOUNTER — OFFICE VISIT (OUTPATIENT)
Dept: SURGERY | Facility: CLINIC | Age: 19
End: 2019-06-03

## 2019-06-03 DIAGNOSIS — Z09 SURGICAL FOLLOW-UP CARE: Primary | ICD-10-CM

## 2019-06-03 PROCEDURE — 99024 POSTOP FOLLOW-UP VISIT: CPT | Performed by: SURGERY

## 2019-06-03 NOTE — PROGRESS NOTES
1 mon PO lap elyssa - states pain is no better and is 7/10 on a pain scale and states she is only having 1 bowel movement a week and some weeks none  Complains of lower abdominal pain primarily centered below.  She denies any nausea or vomiting.  She says that she is been drinking a fair amount of water.  She has had constipation only having one bowel movement per week.  She normally has 1 bowel movement per day.  Physical exam this alert white female in no active distress she has no evidence of a recurrent umbilical hernia.  Her abdomen is soft with mild diffuse tenderness.  I feel her pain is primarily related to constipation I have encouraged her to continue to drink water and start on milk of magnesia 30 cc p.o. twice daily and I will see her back in 1 week

## 2019-06-04 ENCOUNTER — TELEPHONE (OUTPATIENT)
Dept: OBSTETRICS AND GYNECOLOGY | Facility: CLINIC | Age: 19
End: 2019-06-04

## 2019-06-04 ENCOUNTER — OFFICE VISIT (OUTPATIENT)
Dept: OBSTETRICS AND GYNECOLOGY | Facility: CLINIC | Age: 19
End: 2019-06-04

## 2019-06-04 ENCOUNTER — PROCEDURE VISIT (OUTPATIENT)
Dept: OBSTETRICS AND GYNECOLOGY | Facility: CLINIC | Age: 19
End: 2019-06-04

## 2019-06-04 VITALS
HEIGHT: 59 IN | DIASTOLIC BLOOD PRESSURE: 60 MMHG | WEIGHT: 144 LBS | BODY MASS INDEX: 29.03 KG/M2 | SYSTOLIC BLOOD PRESSURE: 114 MMHG

## 2019-06-04 DIAGNOSIS — N83.201 CYST OF RIGHT OVARY: Primary | ICD-10-CM

## 2019-06-04 DIAGNOSIS — R10.2 PELVIC PAIN: Primary | ICD-10-CM

## 2019-06-04 DIAGNOSIS — Z30.2 REQUEST FOR STERILIZATION: ICD-10-CM

## 2019-06-04 PROCEDURE — 76830 TRANSVAGINAL US NON-OB: CPT | Performed by: NURSE PRACTITIONER

## 2019-06-04 PROCEDURE — 99214 OFFICE O/P EST MOD 30 MIN: CPT | Performed by: NURSE PRACTITIONER

## 2019-06-04 RX ORDER — HYDROCODONE BITARTRATE AND ACETAMINOPHEN 5; 325 MG/1; MG/1
1 TABLET ORAL EVERY 6 HOURS PRN
Qty: 10 TABLET | Refills: 0 | Status: SHIPPED | OUTPATIENT
Start: 2019-06-04 | End: 2019-06-17

## 2019-06-04 RX ORDER — HYDROCODONE BITARTRATE AND ACETAMINOPHEN 5; 325 MG/1; MG/1
1 TABLET ORAL EVERY 4 HOURS PRN
Qty: 10 TABLET | Refills: 0 | Status: SHIPPED | OUTPATIENT
Start: 2019-06-04 | End: 2019-06-04

## 2019-06-04 NOTE — TELEPHONE ENCOUNTER
I heard her tell the pharmacist that we were sending it somewhere else for her. So they should have cancelled it.

## 2019-06-04 NOTE — TELEPHONE ENCOUNTER
[facility]. She wants it closer to home because she has no insurance and I believe is going to have someone get it for her. Can you resend it

## 2019-06-04 NOTE — PROGRESS NOTES
"Subjective     Chief Complaint   Patient presents with   • Pelvic Pain       Cindy Guerin is a 19 y.o.  whose LMP is No LMP recorded. Patient has had an implant.. She presents with complaints of (R) sided pain. She has a history of ovarian cyst. She is using the Nexplanon. States her pain started several days ago but is worsening to the point she is unable to get out of bed. She has tried taking Tylenol and Ibuprofen for her pain but states, that it \"does not touch the pain.\"  She is S/P hernia repair and cholecystectomy.  She is pleading today for her tubes to be tied so she can have her Nexplanon removed. She is accompanied by her partner today.     HPI    HPI    The following portions of the patient's history were reviewed and updated as appropriate:vital signs, allergies, current medications, past medical history, past social history, past surgical history and problem list      Review of Systems     Review of Systems   Constitutional: Negative.    Gastrointestinal: Positive for abdominal pain (she is post op). Negative for abdominal distention, blood in stool, nausea and vomiting.   Genitourinary: Positive for pelvic pain. Negative for dysuria, menstrual problem and vaginal bleeding.   Psychiatric/Behavioral: Negative.        Objective      /60   Ht 149.9 cm (59\")   Wt 65.3 kg (144 lb)   Breastfeeding? No   BMI 29.08 kg/m²     Physical Exam    Physical Exam   Constitutional: She is oriented to person, place, and time. She appears well-developed and well-nourished.   Abdominal: Soft. Bowel sounds are normal. There is tenderness in the right lower quadrant. Hernia confirmed negative in the right inguinal area and confirmed negative in the left inguinal area.   Genitourinary: Rectum normal. Pelvic exam was performed with patient supine. There is no rash, tenderness, lesion or injury on the right labia. There is no rash, tenderness, lesion or injury on the left labia. Uterus is not deviated, " not enlarged, not fixed and not tender. Cervix exhibits no motion tenderness, no discharge and no friability. Right adnexum displays tenderness. Right adnexum displays no mass and no fullness. Left adnexum displays no mass, no tenderness and no fullness. No erythema, tenderness or bleeding in the vagina. No foreign body in the vagina. No signs of injury around the vagina. No vaginal discharge found.   Lymphadenopathy:        Right: No inguinal adenopathy present.        Left: No inguinal adenopathy present.   Neurological: She is alert and oriented to person, place, and time.   Skin: Skin is warm and dry.   Psychiatric: She has a normal mood and affect. Her behavior is normal.   Vitals reviewed.      Lab Review   Labs: Urine pregnancy test, Urinalysis - with micro     Imaging   Ultrasound - Pelvic Vaginal US IMP: The uterus is retroverted and appears normal in shape and contour. The EL measures 0.3cm.  There is a cyst seen within the right ovary that measures 4.4 x3.5cm. The left ovary appears normal in size and shape.     Assessment  There are no diagnoses linked to this encounter.    Additional Assessment:   1) (R) ovarian cyst    2) Requesting permanent sterilization      Plan     1. (R) ovarian cyst- Cyst measures 4.4 x 3.4 cm. She understands she is not a surgical candidate at this time. She understands that her increased frequency of cyst could be related to her Nexplanon birthcontrol. Plan to trial a pack of OCPs, Taytulla samples provided x 2. Rev ovarian torsion warnings. ALFRED Rodriguez, #10. Gui collected. Repeat US in 1month.     2. Scheduled for: STD Labs - N/A , Ultrasound of the -  PELVIC , Mammography - N/A , Bone Density Test - N/A , Additional Labs - N/A    3. Contraception: Discussed contraception options at length including pills, patch, vaginal ring, POPs,  injection, implant, and IUDs.  The risks and benefits of the methods were discussed including but not limited to the increased risk of heart  attack, blood clot, and stroke.  It was discussed the contraception does not protect against sexually transmitted infections and condoms are encouraged.  She understands that IUD and Nexplanon could increase her risk for ovarian cyst. She desires a tubal ligation. She is not eligible for a tubal due to her age at this time. He partner is not willing to have a vasectomy. She understands at the age of 20, she can have a tubal.     4. STD:  Enc condom use.     5.  BMI Status: Patient's Body mass index is 29.08 kg/m². BMI is above normal parameters. Recommendations include: educational material.    RTO 1 month    Counseling was given to patient and family for the following topics: diagnostic results, instructions for management, risk factor reductions, prognosis, patient and family education, impressions, risks and benefits of treatment options and importance of treatment compliance . Total time of the encounter was 30 minutes and 20 minutes was spend counseling.    Dalia Guillen, SAMANTHA  6/4/2019

## 2019-06-07 ENCOUNTER — TELEPHONE (OUTPATIENT)
Dept: OBSTETRICS AND GYNECOLOGY | Facility: CLINIC | Age: 19
End: 2019-06-07

## 2019-06-07 ENCOUNTER — TELEPHONE (OUTPATIENT)
Dept: NEUROLOGY | Facility: CLINIC | Age: 19
End: 2019-06-07

## 2019-06-07 NOTE — TELEPHONE ENCOUNTER
----- Message from Misa Villarreal sent at 6/7/2019 11:35 AM EDT -----  Contact: [phone]  Patient called and ask if I could get her scheduled at [facility], due to transportation issues. At this moment patient no longer has insurance, so can't have her MRI done. She also has an appointment next month, and may not keep it. Just wanted to make  aware.

## 2019-06-07 NOTE — TELEPHONE ENCOUNTER
Pt was seen for cyst this past week and she  took 800 mg ibuprofen and she's still in pain she claims it feels like someone is punching her on her left side, what do you recommend for the pt?

## 2019-06-07 NOTE — TELEPHONE ENCOUNTER
I understand.  I want to make sure she stays on the Fycompa so if there is no insurance coverage we need to figure out how to help her

## 2019-06-10 PROBLEM — N83.201 CYST OF RIGHT OVARY: Status: ACTIVE | Noted: 2019-06-10

## 2019-06-11 ENCOUNTER — HOSPITAL ENCOUNTER (EMERGENCY)
Facility: HOSPITAL | Age: 19
Discharge: HOME | End: 2019-06-11
Attending: EMERGENCY MEDICINE | Admitting: EMERGENCY MEDICINE

## 2019-06-11 ENCOUNTER — TELEPHONE (OUTPATIENT)
Dept: OBSTETRICS AND GYNECOLOGY | Facility: CLINIC | Age: 19
End: 2019-06-11

## 2019-06-11 ENCOUNTER — TELEPHONE (OUTPATIENT)
Dept: NEUROLOGY | Facility: CLINIC | Age: 19
End: 2019-06-11

## 2019-06-11 VITALS
HEIGHT: 59 IN | HEART RATE: 98 BPM | BODY MASS INDEX: 30.64 KG/M2 | WEIGHT: 152 LBS | OXYGEN SATURATION: 97 % | SYSTOLIC BLOOD PRESSURE: 110 MMHG | DIASTOLIC BLOOD PRESSURE: 73 MMHG | TEMPERATURE: 98.9 F | RESPIRATION RATE: 16 BRPM

## 2019-06-11 DIAGNOSIS — N83.209 RUPTURED OVARIAN CYST: Primary | ICD-10-CM

## 2019-06-11 DIAGNOSIS — N93.9 VAGINAL BLEEDING: ICD-10-CM

## 2019-06-11 LAB
BACTERIA UR QL AUTO: ABNORMAL /HPF
BASOPHILS # BLD AUTO: 0.03 10*3/MM3 (ref 0–0.2)
BASOPHILS NFR BLD AUTO: 0.4 % (ref 0–1.5)
BILIRUB UR QL STRIP: NEGATIVE
CLARITY UR: CLEAR
COLOR UR: YELLOW
DEPRECATED RDW RBC AUTO: 39.8 FL (ref 37–54)
EOSINOPHIL # BLD AUTO: 0.39 10*3/MM3 (ref 0–0.4)
EOSINOPHIL NFR BLD AUTO: 4.8 % (ref 0.3–6.2)
ERYTHROCYTE [DISTWIDTH] IN BLOOD BY AUTOMATED COUNT: 12.4 % (ref 12.3–15.4)
GLUCOSE UR STRIP-MCNC: NEGATIVE MG/DL
HCG SERPL QL: NEGATIVE
HCT VFR BLD AUTO: 41.8 % (ref 34–46.6)
HGB BLD-MCNC: 13.7 G/DL (ref 12–15.9)
HGB UR QL STRIP.AUTO: ABNORMAL
HYALINE CASTS UR QL AUTO: ABNORMAL /LPF
IMM GRANULOCYTES # BLD AUTO: 0.03 10*3/MM3 (ref 0–0.05)
IMM GRANULOCYTES NFR BLD AUTO: 0.4 % (ref 0–0.5)
KETONES UR QL STRIP: NEGATIVE
LEUKOCYTE ESTERASE UR QL STRIP.AUTO: NEGATIVE
LYMPHOCYTES # BLD AUTO: 3.35 10*3/MM3 (ref 0.7–3.1)
LYMPHOCYTES NFR BLD AUTO: 41.5 % (ref 19.6–45.3)
MCH RBC QN AUTO: 28.8 PG (ref 26.6–33)
MCHC RBC AUTO-ENTMCNC: 32.8 G/DL (ref 31.5–35.7)
MCV RBC AUTO: 88 FL (ref 79–97)
MONOCYTES # BLD AUTO: 0.83 10*3/MM3 (ref 0.1–0.9)
MONOCYTES NFR BLD AUTO: 10.3 % (ref 5–12)
NEUTROPHILS # BLD AUTO: 3.45 10*3/MM3 (ref 1.7–7)
NEUTROPHILS NFR BLD AUTO: 42.6 % (ref 42.7–76)
NITRITE UR QL STRIP: NEGATIVE
NRBC BLD AUTO-RTO: 0 /100 WBC (ref 0–0.2)
PH UR STRIP.AUTO: 5.5 [PH] (ref 4.5–8)
PLATELET # BLD AUTO: 327 10*3/MM3 (ref 140–450)
PMV BLD AUTO: 10.6 FL (ref 6–12)
PROT UR QL STRIP: ABNORMAL
RBC # BLD AUTO: 4.75 10*6/MM3 (ref 3.77–5.28)
RBC # UR: ABNORMAL /HPF
REF LAB TEST METHOD: ABNORMAL
SP GR UR STRIP: 1.03 (ref 1–1.03)
SQUAMOUS #/AREA URNS HPF: ABNORMAL /HPF
UROBILINOGEN UR QL STRIP: ABNORMAL
WBC NRBC COR # BLD: 8.08 10*3/MM3 (ref 3.4–10.8)
WBC UR QL AUTO: ABNORMAL /HPF

## 2019-06-11 PROCEDURE — 84703 CHORIONIC GONADOTROPIN ASSAY: CPT | Performed by: EMERGENCY MEDICINE

## 2019-06-11 PROCEDURE — 96372 THER/PROPH/DIAG INJ SC/IM: CPT

## 2019-06-11 PROCEDURE — 99282 EMERGENCY DEPT VISIT SF MDM: CPT | Performed by: EMERGENCY MEDICINE

## 2019-06-11 PROCEDURE — 99283 EMERGENCY DEPT VISIT LOW MDM: CPT

## 2019-06-11 PROCEDURE — 81001 URINALYSIS AUTO W/SCOPE: CPT | Performed by: EMERGENCY MEDICINE

## 2019-06-11 PROCEDURE — 25010000002 KETOROLAC TROMETHAMINE PER 15 MG: Performed by: EMERGENCY MEDICINE

## 2019-06-11 PROCEDURE — 85025 COMPLETE CBC W/AUTO DIFF WBC: CPT | Performed by: EMERGENCY MEDICINE

## 2019-06-11 RX ORDER — KETOROLAC TROMETHAMINE 30 MG/ML
60 INJECTION, SOLUTION INTRAMUSCULAR; INTRAVENOUS ONCE
Status: COMPLETED | OUTPATIENT
Start: 2019-06-11 | End: 2019-06-11

## 2019-06-11 RX ORDER — TRAMADOL HYDROCHLORIDE 50 MG/1
TABLET ORAL
Qty: 15 TABLET | Refills: 0 | Status: SHIPPED | OUTPATIENT
Start: 2019-06-11 | End: 2019-06-25

## 2019-06-11 RX ADMIN — KETOROLAC TROMETHAMINE 60 MG: 30 INJECTION INTRAMUSCULAR; INTRAVENOUS at 15:27

## 2019-06-11 NOTE — TELEPHONE ENCOUNTER
I called this pt. She has no insurance right now. I told her we have some samples of 4 mg Fycompa, because  said he would rather her have that then nothing at all. She stated she lives to far away to come get them and just want an Rx sent to the pharmacy in [facility]. We sent the Rx. I told her that even with GoodRx it would be almost $1000 and she said she would just have to wait to get her insurance reinstated. She also said she still has Keppra and is taking that. I notified .    ----- Message from Vianca Valadez sent at 6/10/2019  1:55 PM EDT -----  Patient called for refill on Fycompa. She said he was increasing it to 6. Also she has been out for 2 days now.

## 2019-06-14 ENCOUNTER — TELEPHONE (OUTPATIENT)
Dept: OBSTETRICS AND GYNECOLOGY | Facility: CLINIC | Age: 19
End: 2019-06-14

## 2019-06-14 ENCOUNTER — TELEPHONE (OUTPATIENT)
Dept: NEUROLOGY | Facility: CLINIC | Age: 19
End: 2019-06-14

## 2019-06-14 NOTE — TELEPHONE ENCOUNTER
I called and LVM for Cindy to call the office.    ----- Message from Misa Villarreal sent at 6/14/2019  2:38 PM EDT -----  Contact: [phone]  Patient called wanting to know if she could get samples of Fycompa. She states the she has been out of medication for a few days and has had 6 seizures. Her passport insurance isn't effective yet, and she has no way to [place].

## 2019-06-14 NOTE — TELEPHONE ENCOUNTER
Patient called in stating that she's still not sure if her cyst busted or not but she is still in a lot of pain, can't eat or anything.  Wants to know what she should do?  Thanks.

## 2019-06-17 ENCOUNTER — OFFICE VISIT (OUTPATIENT)
Dept: SURGERY | Facility: CLINIC | Age: 19
End: 2019-06-17

## 2019-06-17 DIAGNOSIS — Z09 SURGICAL FOLLOW-UP CARE: Primary | ICD-10-CM

## 2019-06-17 PROCEDURE — 99024 POSTOP FOLLOW-UP VISIT: CPT | Performed by: SURGERY

## 2019-06-17 NOTE — PROGRESS NOTES
6 1/2 wk PO dalia bergeron - no changes from last visit, she was seen in the ER for abdominal pain  She has been diagnosed with a right ovarian cyst she complains of a pop in her right lower quadrant and subsequently had some vaginal bleeding.  She has an appoint with GYN on Thursday of this week.  She says she is had the pain for at least 2 weeks.  She says she is having some nausea and occasional vomiting.  Physical exam this alert white female in no active distress she is no evidence of an impulse at her umbilicus.  Her abdomen shows mild tenderness in the right low pelvic area.  I see no evidence of recurrent hernia.  She can liberalize her activities and she needs to follow-up with GYN  I will see her as needed

## 2019-06-20 ENCOUNTER — PREP FOR SURGERY (OUTPATIENT)
Dept: OTHER | Facility: HOSPITAL | Age: 19
End: 2019-06-20

## 2019-06-20 ENCOUNTER — OFFICE VISIT (OUTPATIENT)
Dept: OBSTETRICS AND GYNECOLOGY | Facility: CLINIC | Age: 19
End: 2019-06-20

## 2019-06-20 ENCOUNTER — PROCEDURE VISIT (OUTPATIENT)
Dept: OBSTETRICS AND GYNECOLOGY | Facility: CLINIC | Age: 19
End: 2019-06-20

## 2019-06-20 ENCOUNTER — TELEPHONE (OUTPATIENT)
Dept: OBSTETRICS AND GYNECOLOGY | Facility: CLINIC | Age: 19
End: 2019-06-20

## 2019-06-20 VITALS
BODY MASS INDEX: 28.83 KG/M2 | DIASTOLIC BLOOD PRESSURE: 70 MMHG | SYSTOLIC BLOOD PRESSURE: 110 MMHG | WEIGHT: 143 LBS | HEIGHT: 59 IN

## 2019-06-20 DIAGNOSIS — N83.201 CYST OF RIGHT OVARY: Primary | ICD-10-CM

## 2019-06-20 DIAGNOSIS — Z11.3 SCREENING EXAMINATION FOR STD (SEXUALLY TRANSMITTED DISEASE): ICD-10-CM

## 2019-06-20 DIAGNOSIS — Z13.9 SCREENING FOR CONDITION: ICD-10-CM

## 2019-06-20 DIAGNOSIS — N83.201 RIGHT OVARIAN CYST: ICD-10-CM

## 2019-06-20 DIAGNOSIS — R10.2 PELVIC PAIN: Primary | ICD-10-CM

## 2019-06-20 DIAGNOSIS — R10.31 RIGHT LOWER QUADRANT PAIN: Primary | ICD-10-CM

## 2019-06-20 LAB
B-HCG UR QL: NEGATIVE
BILIRUB BLD-MCNC: NEGATIVE MG/DL
CLARITY, POC: CLEAR
COLOR UR: YELLOW
GLUCOSE UR STRIP-MCNC: NEGATIVE MG/DL
INTERNAL NEGATIVE CONTROL: NEGATIVE
INTERNAL POSITIVE CONTROL: POSITIVE
KETONES UR QL: NEGATIVE
LEUKOCYTE EST, POC: NEGATIVE
Lab: NORMAL
NITRITE UR-MCNC: NEGATIVE MG/ML
PH UR: 5 [PH] (ref 5–8)
PROT UR STRIP-MCNC: NEGATIVE MG/DL
RBC # UR STRIP: NEGATIVE /UL
SP GR UR: 1.02 (ref 1–1.03)
UROBILINOGEN UR QL: NORMAL

## 2019-06-20 PROCEDURE — 99214 OFFICE O/P EST MOD 30 MIN: CPT | Performed by: NURSE PRACTITIONER

## 2019-06-20 PROCEDURE — 76830 TRANSVAGINAL US NON-OB: CPT | Performed by: NURSE PRACTITIONER

## 2019-06-20 PROCEDURE — 81025 URINE PREGNANCY TEST: CPT | Performed by: NURSE PRACTITIONER

## 2019-06-20 RX ORDER — SODIUM CHLORIDE 0.9 % (FLUSH) 0.9 %
3 SYRINGE (ML) INJECTION EVERY 12 HOURS SCHEDULED
Status: CANCELLED | OUTPATIENT
Start: 2019-06-20

## 2019-06-20 RX ORDER — SODIUM CHLORIDE 0.9 % (FLUSH) 0.9 %
1-10 SYRINGE (ML) INJECTION AS NEEDED
Status: CANCELLED | OUTPATIENT
Start: 2019-06-20

## 2019-06-20 RX ORDER — SODIUM CHLORIDE 9 MG/ML
40 INJECTION, SOLUTION INTRAVENOUS AS NEEDED
Status: CANCELLED | OUTPATIENT
Start: 2019-06-20

## 2019-06-20 NOTE — TELEPHONE ENCOUNTER
Called pt to disc pain medication. Message left for Cindy to call the office. Please advise Cindy that after rev of her Gui, I can not prescribe more pain medication. She has had 90 percocet, 20 diluadid, 10 Norco, and 50 tramadol. She should have some of that medication left. She can take Ibuprofen 600mg TID if needed. I can not call in more pain medication for her at this time. If her pain is so severe, she needs to return to the ER. I am sorry but with a history of drug abuse, I can not continue to prescribe pain medication. She will have her surgery as scheduled on Wednesday with Dr. Cardona. Dr. Cardona is aware of this decision and agrees with the plan of care.

## 2019-06-21 NOTE — TELEPHONE ENCOUNTER
Patient called and wanted to know if Dalia had called in her prescription. I informed her of the message in the chart, and she stated that she had been taking tylenol and it hasn't helped at all.

## 2019-06-24 LAB
C TRACH RRNA SPEC QL NAA+PROBE: NEGATIVE
N GONORRHOEA RRNA SPEC QL NAA+PROBE: NEGATIVE
T VAGINALIS RRNA VAG QL NAA+PROBE: POSITIVE

## 2019-06-24 RX ORDER — METRONIDAZOLE 500 MG/1
500 TABLET ORAL 2 TIMES DAILY
Qty: 14 TABLET | Refills: 0 | Status: SHIPPED | OUTPATIENT
Start: 2019-06-24 | End: 2019-06-25

## 2019-06-25 ENCOUNTER — TELEPHONE (OUTPATIENT)
Dept: OBSTETRICS AND GYNECOLOGY | Facility: CLINIC | Age: 19
End: 2019-06-25

## 2019-06-25 PROBLEM — R10.31 RIGHT LOWER QUADRANT PAIN: Status: ACTIVE | Noted: 2019-06-25

## 2019-06-25 PROBLEM — N83.201 RIGHT OVARIAN CYST: Status: ACTIVE | Noted: 2019-06-25

## 2019-06-25 RX ORDER — METRONIDAZOLE 500 MG/1
500 TABLET ORAL 2 TIMES DAILY
COMMUNITY
End: 2019-07-09

## 2019-06-26 ENCOUNTER — HOSPITAL ENCOUNTER (OUTPATIENT)
Facility: HOSPITAL | Age: 19
Setting detail: HOSPITAL OUTPATIENT SURGERY
Discharge: HOME | End: 2019-06-26
Attending: OBSTETRICS & GYNECOLOGY | Admitting: OBSTETRICS & GYNECOLOGY

## 2019-06-26 ENCOUNTER — ANESTHESIA EVENT (OUTPATIENT)
Dept: PERIOP | Facility: HOSPITAL | Age: 19
End: 2019-06-26

## 2019-06-26 ENCOUNTER — ANESTHESIA (OUTPATIENT)
Dept: PERIOP | Facility: HOSPITAL | Age: 19
End: 2019-06-26

## 2019-06-26 VITALS
OXYGEN SATURATION: 98 % | BODY MASS INDEX: 28.72 KG/M2 | DIASTOLIC BLOOD PRESSURE: 69 MMHG | HEART RATE: 56 BPM | SYSTOLIC BLOOD PRESSURE: 104 MMHG | RESPIRATION RATE: 15 BRPM | WEIGHT: 142.2 LBS | TEMPERATURE: 97.4 F

## 2019-06-26 DIAGNOSIS — R10.31 RIGHT LOWER QUADRANT PAIN: ICD-10-CM

## 2019-06-26 DIAGNOSIS — N83.201 RIGHT OVARIAN CYST: ICD-10-CM

## 2019-06-26 LAB — HCG SERPL QL: NEGATIVE

## 2019-06-26 PROCEDURE — 88305 TISSUE EXAM BY PATHOLOGIST: CPT | Performed by: OBSTETRICS & GYNECOLOGY

## 2019-06-26 PROCEDURE — 25010000002 DEXAMETHASONE PER 1 MG: Performed by: NURSE ANESTHETIST, CERTIFIED REGISTERED

## 2019-06-26 PROCEDURE — 25010000002 FENTANYL CITRATE (PF) 100 MCG/2ML SOLUTION: Performed by: NURSE ANESTHETIST, CERTIFIED REGISTERED

## 2019-06-26 PROCEDURE — 58662 LAPAROSCOPY EXCISE LESIONS: CPT | Performed by: OBSTETRICS & GYNECOLOGY

## 2019-06-26 PROCEDURE — 84703 CHORIONIC GONADOTROPIN ASSAY: CPT | Performed by: OBSTETRICS & GYNECOLOGY

## 2019-06-26 PROCEDURE — 25010000002 PROPOFOL 10 MG/ML EMULSION: Performed by: NURSE ANESTHETIST, CERTIFIED REGISTERED

## 2019-06-26 PROCEDURE — 25010000002 ONDANSETRON PER 1 MG: Performed by: NURSE ANESTHETIST, CERTIFIED REGISTERED

## 2019-06-26 PROCEDURE — 25010000002 MIDAZOLAM PER 1 MG: Performed by: NURSE ANESTHETIST, CERTIFIED REGISTERED

## 2019-06-26 PROCEDURE — 49321 LAPAROSCOPY BIOPSY: CPT | Performed by: OBSTETRICS & GYNECOLOGY

## 2019-06-26 PROCEDURE — S0260 H&P FOR SURGERY: HCPCS | Performed by: OBSTETRICS & GYNECOLOGY

## 2019-06-26 RX ORDER — OXYCODONE HYDROCHLORIDE AND ACETAMINOPHEN 5; 325 MG/1; MG/1
1 TABLET ORAL EVERY 4 HOURS PRN
Qty: 20 TABLET | Refills: 0 | Status: SHIPPED | OUTPATIENT
Start: 2019-06-26 | End: 2021-07-14 | Stop reason: HOSPADM

## 2019-06-26 RX ORDER — SODIUM CHLORIDE 0.9 % (FLUSH) 0.9 %
1-10 SYRINGE (ML) INJECTION AS NEEDED
Status: DISCONTINUED | OUTPATIENT
Start: 2019-06-26 | End: 2019-06-26 | Stop reason: HOSPADM

## 2019-06-26 RX ORDER — SODIUM CHLORIDE, SODIUM LACTATE, POTASSIUM CHLORIDE, CALCIUM CHLORIDE 600; 310; 30; 20 MG/100ML; MG/100ML; MG/100ML; MG/100ML
100 INJECTION, SOLUTION INTRAVENOUS CONTINUOUS
Status: DISCONTINUED | OUTPATIENT
Start: 2019-06-26 | End: 2019-06-26 | Stop reason: HOSPADM

## 2019-06-26 RX ORDER — SODIUM CHLORIDE, SODIUM LACTATE, POTASSIUM CHLORIDE, CALCIUM CHLORIDE 600; 310; 30; 20 MG/100ML; MG/100ML; MG/100ML; MG/100ML
INJECTION, SOLUTION INTRAVENOUS CONTINUOUS PRN
Status: DISCONTINUED | OUTPATIENT
Start: 2019-06-26 | End: 2019-06-26 | Stop reason: SURG

## 2019-06-26 RX ORDER — PROPOFOL 10 MG/ML
VIAL (ML) INTRAVENOUS AS NEEDED
Status: DISCONTINUED | OUTPATIENT
Start: 2019-06-26 | End: 2019-06-26 | Stop reason: SURG

## 2019-06-26 RX ORDER — FENTANYL CITRATE 50 UG/ML
INJECTION, SOLUTION INTRAMUSCULAR; INTRAVENOUS AS NEEDED
Status: DISCONTINUED | OUTPATIENT
Start: 2019-06-26 | End: 2019-06-26 | Stop reason: SURG

## 2019-06-26 RX ORDER — LIDOCAINE HYDROCHLORIDE 20 MG/ML
INJECTION, SOLUTION INFILTRATION; PERINEURAL AS NEEDED
Status: DISCONTINUED | OUTPATIENT
Start: 2019-06-26 | End: 2019-06-26 | Stop reason: SURG

## 2019-06-26 RX ORDER — ACETAMINOPHEN 500 MG
1000 TABLET ORAL ONCE
Status: COMPLETED | OUTPATIENT
Start: 2019-06-26 | End: 2019-06-26

## 2019-06-26 RX ORDER — SODIUM CHLORIDE 9 MG/ML
40 INJECTION, SOLUTION INTRAVENOUS AS NEEDED
Status: DISCONTINUED | OUTPATIENT
Start: 2019-06-26 | End: 2019-06-26 | Stop reason: HOSPADM

## 2019-06-26 RX ORDER — MIDAZOLAM HYDROCHLORIDE 1 MG/ML
1 INJECTION INTRAMUSCULAR; INTRAVENOUS
Status: DISCONTINUED | OUTPATIENT
Start: 2019-06-26 | End: 2019-06-26 | Stop reason: HOSPADM

## 2019-06-26 RX ORDER — ONDANSETRON 2 MG/ML
4 INJECTION INTRAMUSCULAR; INTRAVENOUS ONCE AS NEEDED
Status: COMPLETED | OUTPATIENT
Start: 2019-06-26 | End: 2019-06-26

## 2019-06-26 RX ORDER — MAGNESIUM HYDROXIDE 1200 MG/15ML
LIQUID ORAL AS NEEDED
Status: DISCONTINUED | OUTPATIENT
Start: 2019-06-26 | End: 2019-06-26 | Stop reason: HOSPADM

## 2019-06-26 RX ORDER — IBUPROFEN 800 MG/1
800 TABLET ORAL EVERY 8 HOURS PRN
Qty: 30 TABLET | Refills: 0 | Status: SHIPPED | OUTPATIENT
Start: 2019-06-26 | End: 2021-07-14 | Stop reason: HOSPADM

## 2019-06-26 RX ORDER — KETAMINE HYDROCHLORIDE 10 MG/ML
INJECTION INTRAMUSCULAR; INTRAVENOUS AS NEEDED
Status: DISCONTINUED | OUTPATIENT
Start: 2019-06-26 | End: 2019-06-26 | Stop reason: SURG

## 2019-06-26 RX ORDER — FENTANYL CITRATE 50 UG/ML
25 INJECTION, SOLUTION INTRAMUSCULAR; INTRAVENOUS
Status: DISCONTINUED | OUTPATIENT
Start: 2019-06-26 | End: 2019-06-26 | Stop reason: HOSPADM

## 2019-06-26 RX ORDER — SODIUM CHLORIDE 0.9 % (FLUSH) 0.9 %
3 SYRINGE (ML) INJECTION EVERY 12 HOURS SCHEDULED
Status: DISCONTINUED | OUTPATIENT
Start: 2019-06-26 | End: 2019-06-26 | Stop reason: HOSPADM

## 2019-06-26 RX ORDER — DEXAMETHASONE SODIUM PHOSPHATE 4 MG/ML
8 INJECTION, SOLUTION INTRA-ARTICULAR; INTRALESIONAL; INTRAMUSCULAR; INTRAVENOUS; SOFT TISSUE ONCE
Status: COMPLETED | OUTPATIENT
Start: 2019-06-26 | End: 2019-06-26

## 2019-06-26 RX ORDER — ONDANSETRON 2 MG/ML
4 INJECTION INTRAMUSCULAR; INTRAVENOUS ONCE AS NEEDED
Status: DISCONTINUED | OUTPATIENT
Start: 2019-06-26 | End: 2019-06-26 | Stop reason: HOSPADM

## 2019-06-26 RX ORDER — ROCURONIUM BROMIDE 10 MG/ML
INJECTION, SOLUTION INTRAVENOUS AS NEEDED
Status: DISCONTINUED | OUTPATIENT
Start: 2019-06-26 | End: 2019-06-26 | Stop reason: SURG

## 2019-06-26 RX ORDER — KETOROLAC TROMETHAMINE 30 MG/ML
30 INJECTION, SOLUTION INTRAMUSCULAR; INTRAVENOUS ONCE
Status: DISCONTINUED | OUTPATIENT
Start: 2019-06-26 | End: 2019-06-26 | Stop reason: HOSPADM

## 2019-06-26 RX ORDER — GLYCOPYRROLATE 0.2 MG/ML
INJECTION INTRAMUSCULAR; INTRAVENOUS AS NEEDED
Status: DISCONTINUED | OUTPATIENT
Start: 2019-06-26 | End: 2019-06-26 | Stop reason: SURG

## 2019-06-26 RX ORDER — MIDAZOLAM HYDROCHLORIDE 1 MG/ML
2 INJECTION INTRAMUSCULAR; INTRAVENOUS
Status: DISCONTINUED | OUTPATIENT
Start: 2019-06-26 | End: 2019-06-26 | Stop reason: HOSPADM

## 2019-06-26 RX ADMIN — SODIUM CHLORIDE, POTASSIUM CHLORIDE, SODIUM LACTATE AND CALCIUM CHLORIDE: 600; 310; 30; 20 INJECTION, SOLUTION INTRAVENOUS at 12:57

## 2019-06-26 RX ADMIN — ROCURONIUM BROMIDE 40 MG: 10 INJECTION INTRAVENOUS at 13:02

## 2019-06-26 RX ADMIN — FENTANYL CITRATE 50 MCG: 50 INJECTION, SOLUTION INTRAMUSCULAR; INTRAVENOUS at 13:27

## 2019-06-26 RX ADMIN — PROPOFOL 200 MG: 10 INJECTION, EMULSION INTRAVENOUS at 13:02

## 2019-06-26 RX ADMIN — DEXAMETHASONE SODIUM PHOSPHATE 8 MG: 4 INJECTION, SOLUTION INTRAMUSCULAR; INTRAVENOUS at 12:53

## 2019-06-26 RX ADMIN — GLYCOPYRROLATE 0.2 MG: 0.2 INJECTION INTRAMUSCULAR; INTRAVENOUS at 13:02

## 2019-06-26 RX ADMIN — FAMOTIDINE 20 MG: 10 INJECTION, SOLUTION INTRAVENOUS at 12:53

## 2019-06-26 RX ADMIN — ONDANSETRON 4 MG: 2 INJECTION, SOLUTION INTRAMUSCULAR; INTRAVENOUS at 12:53

## 2019-06-26 RX ADMIN — MIDAZOLAM HYDROCHLORIDE 1 MG: 1 INJECTION, SOLUTION INTRAMUSCULAR; INTRAVENOUS at 12:54

## 2019-06-26 RX ADMIN — KETAMINE HYDROCHLORIDE 20 MG: 10 INJECTION INTRAMUSCULAR; INTRAVENOUS at 13:02

## 2019-06-26 RX ADMIN — FENTANYL CITRATE 25 MCG: 50 INJECTION INTRAMUSCULAR; INTRAVENOUS at 14:34

## 2019-06-26 RX ADMIN — SUGAMMADEX 130 MG: 100 INJECTION, SOLUTION INTRAVENOUS at 13:45

## 2019-06-26 RX ADMIN — ACETAMINOPHEN 1000 MG: 500 TABLET, FILM COATED ORAL at 12:53

## 2019-06-26 RX ADMIN — LIDOCAINE HYDROCHLORIDE 100 MG: 20 INJECTION, SOLUTION INFILTRATION; PERINEURAL at 12:59

## 2019-06-26 RX ADMIN — FENTANYL CITRATE 50 MCG: 50 INJECTION, SOLUTION INTRAMUSCULAR; INTRAVENOUS at 13:24

## 2019-06-26 NOTE — ANESTHESIA POSTPROCEDURE EVALUATION
Patient: Cindy Guerin    Procedure Summary     Date:  06/26/19 Room / Location:   [facility] /  [facility]    Anesthesia Start:  1257 Anesthesia Stop:  1407    Procedure:  DIAGNOSTIC LAPAROSCOPY with right ovarian cystectomy (N/A Abdomen) Diagnosis:       Right lower quadrant pain      Right ovarian cyst      (Right lower quadrant pain [R10.31])      (Right ovarian cyst [N83.201])    Surgeon:  Monica Cardona DO Provider:  Renee Frey CRNA    Anesthesia Type:  general ASA Status:  2          Anesthesia Type: general  Last vitals  BP   114/67 (06/26/19 1453)   Temp   97.4 °F (36.3 °C) (06/26/19 1358)   Pulse   74 (06/26/19 1453)   Resp   15 (06/26/19 1453)     SpO2   98 % (06/26/19 1453)     Post Anesthesia Care and Evaluation    Patient location during evaluation: PHASE II  Patient participation: complete - patient participated  Level of consciousness: awake  Pain management: adequate  Airway patency: patent  Anesthetic complications: No anesthetic complications  PONV Status: none  Cardiovascular status: acceptable  Respiratory status: acceptable  Hydration status: acceptable

## 2019-06-26 NOTE — ANESTHESIA PROCEDURE NOTES
Airway  Urgency: elective    Date/Time: 6/26/2019 1:04 PM  End Time:6/26/2019 1:04 PM  Airway not difficult    General Information and Staff    Patient location during procedure: OR  CRNA: Renee Frey CRNA    Indications and Patient Condition  Indications for airway management: airway protection    Preoxygenated: yes  MILS maintained throughout  Mask difficulty assessment: 1 - vent by mask (easy)    Final Airway Details  Final airway type: endotracheal airway      Successful airway: ETT  Cuffed: yes   Successful intubation technique: direct laryngoscopy  Facilitating devices/methods: intubating stylet  Endotracheal tube insertion site: oral  Blade: Renteria  Blade size: 2  ETT size (mm): 7.0  Cormack-Lehane Classification: grade I - full view of glottis  Placement verified by: chest auscultation and capnometry   Measured from: lips  ETT to lips (cm): 20  Number of attempts at approach: 1    Additional Comments  BEBS, +ETCO2, VSS. TEETH AND GUMS AS PRE-OP.

## 2019-06-26 NOTE — ANESTHESIA PREPROCEDURE EVALUATION
" Anesthesia Evaluation     Patient summary reviewed and Nursing notes reviewed   no history of anesthetic complications:  NPO Solid Status: > 8 hours  NPO Liquid Status: > 8 hours           Airway   Mallampati: I  TM distance: >3 FB  Neck ROM: full  No difficulty expected  Dental    (+) poor dentition        Pulmonary - normal exam    breath sounds clear to auscultation  (+) a smoker (one half pack day) Current Abstained day of surgery,   Cardiovascular - normal exam  Exercise tolerance: good (4-7 METS)    ECG reviewed  Rhythm: regular  Rate: normal      ROS comment: Reading physician: Teresa Ann MD Ordering physician: Fawn Berry MD Study date: 3/14/19  Patient Information     Patient Name  Cindy Guerin MRN  [id number] Sex  Female  (Age)  2000 (19 y.o.)  Interpretation Summary     · Negative clinical evidence of myocardial ischemia. Findings consistent with a normal ECG stress test.    Patient Name  Cindy Guerin MRN  [id number] Sex  Female  (Age)  2000 (19 y.o.)  Sedation Narrator Report     Sedation Narrator Report  Interpretation Summary     · Mild mitral valve regurgitation is present  · Mild tricuspid valve regurgitation is present.  · Calculated EF = 56.0%.  · There is no evidence of pericardial effusion.    Fawn Berry MD     2018  8:35 AM    ECG 12 Lead  Date/Time: 2018 2:38 PM  Performed by: FAWN BERRY  Authorized by: FAWN BERRY   Comparison: not compared with previous ECG   Previous ECG: no previous ECG available  Rhythm: sinus rhythm  ST Flattening: all    Neuro/Psych  (+) seizures (last seizure 2019 neuro said due to lack of sleep . Takes Keppra and  Fycompa) well controlled,     GI/Hepatic/Renal/Endo    (+)   hepatitis (patient states B \"went away\") B and C, renal disease (in past) stones,     Musculoskeletal (-) negative ROS    Abdominal  - normal exam   Substance History - negative use  Drug use: denies.     OB/GYN " negative ob/gyn ROS         Other - negative ROS                No changes since last anesthesia           Anesthesia Plan    ASA 2     general     intravenous induction   Anesthetic plan, all risks, benefits, and alternatives have been provided, discussed and informed consent has been obtained with: patient and spouse/significant other.  Use of blood products discussed with patient  Consented to blood products.

## 2019-07-01 ENCOUNTER — TELEPHONE (OUTPATIENT)
Dept: OBSTETRICS AND GYNECOLOGY | Facility: CLINIC | Age: 19
End: 2019-07-01

## 2019-07-01 PROBLEM — R10.2 PELVIC PAIN: Status: ACTIVE | Noted: 2019-07-01

## 2019-07-01 RX ORDER — METRONIDAZOLE 500 MG/1
500 TABLET ORAL 2 TIMES DAILY
OUTPATIENT
Start: 2019-07-01

## 2019-07-01 NOTE — TELEPHONE ENCOUNTER
Pt called complaining of acute pain requesting a refill of percocet situation was discussed with Dr. Mcdaniel and he suggested the patient either be see by Dr. Cardona on Wednesday and if the patient isn't able to make it to that appt then she needs to be seen in the er. Appt with Dr. Cardona was offered to the pt and the pt said she wouldn't be able to go to ep so I informed the pt to be seen at er.

## 2019-07-05 ENCOUNTER — TELEPHONE (OUTPATIENT)
Dept: OBSTETRICS AND GYNECOLOGY | Facility: CLINIC | Age: 19
End: 2019-07-05

## 2019-07-08 ENCOUNTER — TELEPHONE (OUTPATIENT)
Dept: OBSTETRICS AND GYNECOLOGY | Facility: CLINIC | Age: 19
End: 2019-07-08

## 2019-07-09 RX ORDER — METRONIDAZOLE 500 MG/1
TABLET ORAL
Qty: 14 TABLET | Refills: 0 | OUTPATIENT
Start: 2019-07-09

## 2019-07-09 RX ORDER — METRONIDAZOLE 500 MG/1
500 TABLET ORAL 2 TIMES DAILY
Qty: 14 TABLET | Refills: 0 | Status: SHIPPED | OUTPATIENT
Start: 2019-07-09 | End: 2019-07-16

## 2019-07-11 NOTE — TELEPHONE ENCOUNTER
THERE IS A NOTE PRINTED FOR HER. SHE HAS NOT COME TO PICK IT UP YET. PT HAS BEEN NOTIFIED [facility]  ABOUT IT.

## 2019-07-12 ENCOUNTER — TELEPHONE (OUTPATIENT)
Dept: OBSTETRICS AND GYNECOLOGY | Facility: CLINIC | Age: 19
End: 2019-07-12

## 2019-07-12 NOTE — TELEPHONE ENCOUNTER
Patient showed up 45 mins past appt, stated new she wouldn't be seen and that she just wanted her work note.

## 2019-07-26 ENCOUNTER — TELEPHONE (OUTPATIENT)
Dept: NEUROLOGY | Facility: CLINIC | Age: 19
End: 2019-07-26
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
## 2019-07-29 ENCOUNTER — TELEPHONE (OUTPATIENT)
Dept: NEUROLOGY | Facility: CLINIC | Age: 19
End: 2019-07-29

## 2019-07-31 ENCOUNTER — TREATMENT (OUTPATIENT)
Dept: NEUROLOGY | Facility: CLINIC | Age: 19
End: 2019-07-31

## 2019-08-01 ENCOUNTER — TELEPHONE (OUTPATIENT)
Dept: NEUROLOGY | Facility: CLINIC | Age: 19
End: 2019-08-01
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
## 2019-08-08 ENCOUNTER — PROCEDURE VISIT (OUTPATIENT)
Dept: OBSTETRICS AND GYNECOLOGY | Facility: CLINIC | Age: 19
End: 2019-08-08

## 2019-08-08 ENCOUNTER — OFFICE VISIT (OUTPATIENT)
Dept: OBSTETRICS AND GYNECOLOGY | Facility: CLINIC | Age: 19
End: 2019-08-08

## 2019-08-08 VITALS
SYSTOLIC BLOOD PRESSURE: 100 MMHG | BODY MASS INDEX: 29.73 KG/M2 | DIASTOLIC BLOOD PRESSURE: 66 MMHG | HEIGHT: 59 IN | WEIGHT: 147.5 LBS

## 2019-08-08 DIAGNOSIS — N83.202 CYST OF LEFT OVARY: ICD-10-CM

## 2019-08-08 DIAGNOSIS — Z13.9 SCREENING FOR CONDITION: Primary | ICD-10-CM

## 2019-08-08 DIAGNOSIS — R10.2 PELVIC PAIN: ICD-10-CM

## 2019-08-08 DIAGNOSIS — R10.2 PELVIC PAIN: Primary | ICD-10-CM

## 2019-08-08 PROBLEM — N83.201 RIGHT OVARIAN CYST: Status: RESOLVED | Noted: 2019-06-25 | Resolved: 2019-08-08

## 2019-08-08 PROCEDURE — 76830 TRANSVAGINAL US NON-OB: CPT | Performed by: OBSTETRICS & GYNECOLOGY

## 2019-08-08 PROCEDURE — 99024 POSTOP FOLLOW-UP VISIT: CPT | Performed by: OBSTETRICS & GYNECOLOGY

## 2019-08-08 RX ORDER — NORETHINDRONE ACETATE AND ETHINYL ESTRADIOL 1MG-20(21)
1 KIT ORAL DAILY
Qty: 63 TABLET | Refills: 3 | Status: SHIPPED | OUTPATIENT
Start: 2019-08-08 | End: 2021-06-11

## 2019-08-08 RX ORDER — HYDROCODONE BITARTRATE AND ACETAMINOPHEN 5; 325 MG/1; MG/1
1 TABLET ORAL EVERY 6 HOURS PRN
Qty: 20 TABLET | Refills: 0 | Status: SHIPPED | OUTPATIENT
Start: 2019-08-08 | End: 2021-07-14 | Stop reason: HOSPADM

## 2019-08-08 NOTE — PROGRESS NOTES
"PROBLEM VISIT    Chief Complaint: RLQ and LLQ pain      Cindy Guerin is a 19 y.o. patient who presents complaining of pelvic pain. Pt had similar symptoms in 6/2019 with RLQ pain. Pt was diagnosed with a 3cm ovarian cyst and during surgical removal of the cyst a 1cm endometrial implant was noted and ablated. The source of pt's pain was thought to be due to the implant more than the small ovarian simple cyst. Pt never followed up for a post op appt. Pt has Nexplanon in place for contraception. Pt presents again today complaining of pelvic pain. She is teary eyed during interview.  Chief Complaint   Patient presents with   • Post-op             The following portions of the patient's history were reviewed and updated as appropriate: allergies, current medications and problem list.    Review of Systems   Constitutional: Negative for chills, diaphoresis, fever and unexpected weight change.   Gastrointestinal: Positive for abdominal pain. Negative for abdominal distention, nausea and vomiting.   Genitourinary: Positive for pelvic pain. Negative for menstrual problem, vaginal bleeding and vaginal pain.   Musculoskeletal: Negative for back pain.       /66   Ht 149.9 cm (59.02\")   Wt 66.9 kg (147 lb 8 oz)   Breastfeeding? No   BMI 29.78 kg/m²     Physical Exam   Constitutional: She is oriented to person, place, and time. She appears well-developed and well-nourished. No distress.   HENT:   Head: Normocephalic.   Cardiovascular: Normal rate, regular rhythm and normal heart sounds.   Pulmonary/Chest: Effort normal and breath sounds normal. No stridor. No respiratory distress. She has no wheezes.   Abdominal: Soft. She exhibits no distension and no mass. There is tenderness. There is no rebound and no guarding.   Diffuse tenderness in lower abdomen. No evidence of acute abdomen   Musculoskeletal: Normal range of motion. She exhibits no edema, tenderness or deformity.   Neurological: She is alert and oriented to " person, place, and time. No cranial nerve deficit. Coordination normal.   Skin: Skin is warm. She is not diaphoretic. No erythema. No pallor.   Psychiatric: She has a normal mood and affect. Her behavior is normal. Judgment and thought content normal.   Pt teary eyed during interview   Vitals reviewed.        Assessment/Plan   Cindy was seen today for post-op.    Diagnoses and all orders for this visit:    Screening for condition  -     POC Urinalysis Dipstick  -     POC Pregnancy, Urine    Pelvic pain    Other orders  -     norethindrone-ethinyl estradiol FE (JUNEL FE 1/20) 1-20 MG-MCG per tablet; Take 1 tablet by mouth Daily.  -     HYDROcodone-acetaminophen (LORTAB) 5-325 MG per tablet; Take 1 tablet by mouth Every 6 (Six) Hours As Needed for Moderate Pain .    20yo with pelvic pain    1) pelvic pain: s/p Dx laparoscopy with removal of 3cm right ovarian cyst and ablation of a 1cm endometrial implant in 6/2019. Pt presents with similar symptoms. TVUS reveals a 3 x 2.5cm simple left ovarian cyst. Pt requesting pain meds stating that Ibuprofen is not helping. Discussed with pt that this is a small cyst and she does not have yee cute abdomen so I would not recommend surgery. Will give a month supply of OCPs to see if she has better suppression of the cysts on oral OCPs. Pt to fu in 4 weeks for repeat TVUS and evaluation. If cyst is gone then may consider removing Nexplanon and starting OCPs.    2) Contraception: On Nexplanon    3) Family Planning: pt has 3 children. She does not desire more children and wants permanent sterilization at age 20    4) Hx of drug abuse: Discussed with pt my concern for prescirbing pain pills with her history. Pt States she has no problem with pain pills. Her addiction was to meth. Told t she would only get one script from me. Will check [facility]. Rx Lortab 5/325 #20. No refills.               Return in about 4 weeks (around 9/5/2019).      Monica Cardona,     8/14/2019  6:19 AM

## 2019-08-27 ENCOUNTER — TELEPHONE (OUTPATIENT)
Dept: NEUROLOGY | Facility: CLINIC | Age: 19
End: 2019-08-27

## 2019-08-27 NOTE — TELEPHONE ENCOUNTER
----- Message from Bel Rosario sent at 8/27/2019 12:51 PM EDT -----  Contact: [phone]  Cindy was calling to see if Dr. Concepcion will put her back on Perampanel (FYCOMPA) 6 MG tablet.  She stated that her seizures are increasing. Cindy can be reached at [phone]. Thanks so much.

## 2019-12-02 ENCOUNTER — OFFICE VISIT (OUTPATIENT)
Dept: NEUROLOGY | Facility: CLINIC | Age: 19
End: 2019-12-02

## 2019-12-02 VITALS
WEIGHT: 164 LBS | OXYGEN SATURATION: 99 % | DIASTOLIC BLOOD PRESSURE: 80 MMHG | BODY MASS INDEX: 33.06 KG/M2 | SYSTOLIC BLOOD PRESSURE: 132 MMHG | HEIGHT: 59 IN | HEART RATE: 93 BPM

## 2019-12-02 DIAGNOSIS — G40.319 INTRACTABLE GENERALIZED IDIOPATHIC EPILEPSY WITHOUT STATUS EPILEPTICUS (HCC): Primary | ICD-10-CM

## 2019-12-02 PROCEDURE — 99213 OFFICE O/P EST LOW 20 MIN: CPT | Performed by: PSYCHIATRY & NEUROLOGY
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
## 2019-12-06 ENCOUNTER — TELEPHONE (OUTPATIENT)
Dept: NEUROLOGY | Facility: CLINIC | Age: 19
End: 2019-12-06

## 2019-12-06 NOTE — TELEPHONE ENCOUNTER
Pt called. She states that Dr. Concepcion increased her fycompa recently. Since then she has noticed that she is feeling like she is about to have a seizure more often. I just not feeling right, definitely in the mornings. She said she is interested in going forward with the VNS. She can be reached at [phone].    Please review.  Thank you.

## 2019-12-07 NOTE — TELEPHONE ENCOUNTER
She will not be able to get a VNS referral until we do the 2 tests that were not done last spring:        the MRI brain [now rescheduled for [place]] and          A 48 hour EEG at home [to be arranged through [employer].]    I did not get any paper work to sign on the 48 hour EEG for [employer].  Do you have the [employer] forms that I can sign for this?

## 2019-12-10 NOTE — TELEPHONE ENCOUNTER
"I filled out the [employer] form online for 48-hour in-home EEG.  I attempted to send the form electronically but the system rejected because the home phone number that I listed was picked up as \"invalid\"    So I printed out the form and put it on your desk so you can finish it out and fax it to them!  "

## 2019-12-11 ENCOUNTER — HOSPITAL ENCOUNTER (OUTPATIENT)
Dept: MRI IMAGING | Facility: HOSPITAL | Age: 19
Discharge: HOME | End: 2019-12-11
Admitting: PSYCHIATRY & NEUROLOGY

## 2019-12-11 DIAGNOSIS — G40.319 INTRACTABLE GENERALIZED IDIOPATHIC EPILEPSY WITHOUT STATUS EPILEPTICUS (HCC): ICD-10-CM

## 2019-12-11 PROCEDURE — A9577 INJ MULTIHANCE: HCPCS | Performed by: PSYCHIATRY & NEUROLOGY

## 2019-12-11 PROCEDURE — 70553 MRI BRAIN STEM W/O & W/DYE: CPT

## 2019-12-11 PROCEDURE — 0 GADOBENATE DIMEGLUMINE 529 MG/ML SOLUTION: Performed by: PSYCHIATRY & NEUROLOGY

## 2019-12-11 RX ADMIN — GADOBENATE DIMEGLUMINE 13 ML: 529 INJECTION, SOLUTION INTRAVENOUS at 14:35

## 2020-01-22 ENCOUNTER — TELEPHONE (OUTPATIENT)
Dept: NEUROLOGY | Facility: CLINIC | Age: 20
End: 2020-01-22

## 2020-03-30 NOTE — TELEPHONE ENCOUNTER
Please advise. This is a previous [facility] patient. She had an appointment with you last month but no showed. Gui ran and uploaded.   Thank you.

## 2020-10-04 DIAGNOSIS — G40.319 INTRACTABLE GENERALIZED IDIOPATHIC EPILEPSY WITHOUT STATUS EPILEPTICUS (HCC): Primary | ICD-10-CM

## 2020-10-05 RX ORDER — PERAMPANEL 8 MG/1
TABLET ORAL
Qty: 30 TABLET | Refills: 4 | Status: SHIPPED | OUTPATIENT
Start: 2020-10-05 | End: 2021-02-17

## 2020-11-30 ENCOUNTER — DOCUMENTATION (OUTPATIENT)
Dept: NEUROLOGY | Facility: CLINIC | Age: 20
End: 2020-11-30

## 2020-11-30 NOTE — PROGRESS NOTES
I personally reviewed the tracing of an EEG that was completed on January 15, 2020 that was a 24-hour video recording.  The patient had intermittent bioccipital spikes and polyspikes that would be concerning for occipital lobe epilepsy.

## 2021-01-14 NOTE — TELEPHONE ENCOUNTER
Patient called and states that her stomach is swelling and is the size of a softball, she states she is having severe pain and she states she is vomiting and that she has not had a bowel movement. She states she is chilled but NO fever or any other symptoms.    She states she didn't know what to do and that she has thought about going to the ER   [Home] : at home, [unfilled] , at the time of the visit. [Spouse] : spouse [FreeTextEntry1] : FOLLOW YOUR HEART Virtual HOME VISIT-[place]\par Virtual Home Visit made Anai [facility] ] . A  patient of Dr Abreu   S/P  TAVR on 1/8/21. Discharged from [facility]\par \par \par Virtual visit made with patient for post discharge transitional care management and post surgical follow up. \par Patient and wife seen.  He appears well.\par Denies complaints.  States symptoms prior to surgery was extreme fatigue, feels well now.  Less tired\par \par \par \par

## 2021-02-16 DIAGNOSIS — G40.319 INTRACTABLE GENERALIZED IDIOPATHIC EPILEPSY WITHOUT STATUS EPILEPTICUS (HCC): ICD-10-CM

## 2021-02-17 RX ORDER — PERAMPANEL 8 MG/1
TABLET ORAL
Qty: 30 TABLET | Refills: 5 | Status: SHIPPED | OUTPATIENT
Start: 2021-02-17 | End: 2021-06-09 | Stop reason: SDUPTHER

## 2021-02-17 NOTE — TELEPHONE ENCOUNTER
Please review. Gui ran and uploaded. Pt last seen in office 12/2/19 by Dr. Concepcion. Has not shown up for any appt scheduled with you.     Thank you

## 2021-06-09 ENCOUNTER — TELEPHONE (OUTPATIENT)
Dept: NEUROLOGY | Facility: CLINIC | Age: 21
End: 2021-06-09

## 2021-06-09 DIAGNOSIS — G40.319 INTRACTABLE GENERALIZED IDIOPATHIC EPILEPSY WITHOUT STATUS EPILEPTICUS (HCC): ICD-10-CM

## 2021-06-09 RX ORDER — PERAMPANEL 8 MG/1
1 TABLET ORAL
Qty: 30 TABLET | Refills: 1 | Status: SHIPPED | OUTPATIENT
Start: 2021-06-09 | End: 2021-09-08
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
## 2021-06-11 ENCOUNTER — OFFICE VISIT (OUTPATIENT)
Dept: OBSTETRICS AND GYNECOLOGY | Facility: CLINIC | Age: 21
End: 2021-06-11

## 2021-06-11 VITALS
HEIGHT: 62 IN | SYSTOLIC BLOOD PRESSURE: 118 MMHG | BODY MASS INDEX: 27.6 KG/M2 | WEIGHT: 150 LBS | DIASTOLIC BLOOD PRESSURE: 70 MMHG

## 2021-06-11 DIAGNOSIS — Z01.419 PAP SMEAR, LOW-RISK: Primary | ICD-10-CM

## 2021-06-11 DIAGNOSIS — Z13.89 SCREENING FOR GENITOURINARY CONDITION: ICD-10-CM

## 2021-06-11 DIAGNOSIS — N93.9 ABNORMAL UTERINE BLEEDING (AUB): ICD-10-CM

## 2021-06-11 DIAGNOSIS — Z11.51 SPECIAL SCREENING EXAMINATION FOR HUMAN PAPILLOMAVIRUS (HPV): ICD-10-CM

## 2021-06-11 DIAGNOSIS — Z01.419 ROUTINE GYNECOLOGICAL EXAMINATION: ICD-10-CM

## 2021-06-11 DIAGNOSIS — Z30.2 REQUEST FOR STERILIZATION: ICD-10-CM

## 2021-06-11 PROBLEM — Z30.017 NEXPLANON INSERTION: Status: RESOLVED | Noted: 2019-01-22 | Resolved: 2021-06-11

## 2021-06-11 LAB
B-HCG UR QL: NEGATIVE
BILIRUB BLD-MCNC: NEGATIVE MG/DL
CLARITY, POC: CLEAR
COLOR UR: YELLOW
GLUCOSE UR STRIP-MCNC: NEGATIVE MG/DL
INTERNAL NEGATIVE CONTROL: NORMAL
INTERNAL POSITIVE CONTROL: NORMAL
KETONES UR QL: NEGATIVE
LEUKOCYTE EST, POC: NEGATIVE
Lab: NORMAL
NITRITE UR-MCNC: NEGATIVE MG/ML
PH UR: 5 [PH] (ref 5–8)
PROT UR STRIP-MCNC: NEGATIVE MG/DL
RBC # UR STRIP: NEGATIVE /UL
SP GR UR: 1.02 (ref 1–1.03)
UROBILINOGEN UR QL: NORMAL

## 2021-06-11 PROCEDURE — 99395 PREV VISIT EST AGE 18-39: CPT | Performed by: OBSTETRICS & GYNECOLOGY

## 2021-06-11 PROCEDURE — 3008F BODY MASS INDEX DOCD: CPT | Performed by: OBSTETRICS & GYNECOLOGY

## 2021-06-11 PROCEDURE — 81025 URINE PREGNANCY TEST: CPT | Performed by: OBSTETRICS & GYNECOLOGY

## 2021-06-11 PROCEDURE — 2014F MENTAL STATUS ASSESS: CPT | Performed by: OBSTETRICS & GYNECOLOGY

## 2021-06-11 PROCEDURE — 99214 OFFICE O/P EST MOD 30 MIN: CPT | Performed by: OBSTETRICS & GYNECOLOGY

## 2021-06-11 RX ORDER — SODIUM CHLORIDE 0.9 % (FLUSH) 0.9 %
1-10 SYRINGE (ML) INJECTION AS NEEDED
Status: CANCELLED | OUTPATIENT
Start: 2021-06-11

## 2021-06-11 RX ORDER — SODIUM CHLORIDE 0.9 % (FLUSH) 0.9 %
3 SYRINGE (ML) INJECTION EVERY 12 HOURS SCHEDULED
Status: CANCELLED | OUTPATIENT
Start: 2021-06-11

## 2021-06-11 RX ORDER — SODIUM CHLORIDE 9 MG/ML
40 INJECTION, SOLUTION INTRAVENOUS AS NEEDED
Status: CANCELLED | OUTPATIENT
Start: 2021-06-11

## 2021-06-11 NOTE — PROGRESS NOTES
GYN Annual Exam     CC- Here for annual exam.     Cindy Guerin is a 21 y.o. female who presents for annual well woman exam and with complaints of AUB. Pt reports that her cycles have become increasingly irregular for the last several months with her cycles coming sooner. She had her last cycle on May 1st. She is reporting RLQ pain. She has a hx of a right ovarian cystectomy. She is having unprotected intercourse. She is - she does not have custody of her 4 children. She is not interested in pregnancy. Pt is interested in starting contraception. She is interested in permanent sterilization. She states she is finished with child bearing. Pt has never had a pap smear.    OB History        3    Para   2    Term   2       0    AB   0    Living   2       SAB   0    TAB   0    Ectopic   0    Molar        Multiple   0    Live Births   2                Current contraception: none  History of abnormal Pap smear: no  History of abnormal mammogram: no  Family history of uterine, colon or ovarian cancer: no  Family history of breast cancer: no    Health Maintenance   Topic Date Due   • Annual Gynecologic Pelvic and Breast Exam  Never done   • ANNUAL PHYSICAL  Never done   • HPV VACCINES (1 - 2-dose series) Never done   • PAP SMEAR  Never done   • Hepatitis B (1 of 3 - Risk 3-dose series) Never done   • CHLAMYDIA SCREENING  2020   • INFLUENZA VACCINE  2021   • TDAP/TD VACCINES (3 - Td or Tdap) 01/10/2027   • HEPATITIS C SCREENING  Completed   • COVID-19 Vaccine  Completed   • Pneumococcal Vaccine 0-64  Completed   • MENINGOCOCCAL VACCINE  Aged Out       Past Medical History:   Diagnosis Date   • Abdominal pain, RLQ     sched diag lap   • Anemia    • Hepatitis B    • Hepatitis C    • History of heart murmur in childhood    • History of kidney stones    • HSV-1 infection 2017   • Ovarian cyst    • Seizures (CMS/HCC)     seizure 4/2 caused by lack of sleep per neuro, follows w/Dr Concepcion        Past Surgical History:   Procedure Laterality Date   • ADENOIDECTOMY     •  SECTION      Twins 36 weeks   • CHOLECYSTECTOMY N/A 5/3/2019    Procedure: CHOLECYSTECTOMY LAPAROSCOPIC, repair incision  hernia;  Surgeon: Walter Alvarado MD;  Location:  [facility];  Service: General   • CHOLECYSTECTOMY     • DIAGNOSTIC LAPAROSCOPY N/A 2019    Procedure: DIAGNOSTIC LAPAROSCOPY with right ovarian cystectomy;  Surgeon: Monica Cardona DO;  Location:  [facility];  Service: Obstetrics/Gynecology   • HERNIA REPAIR     • TONSILLECTOMY     • VENTRAL/INCISIONAL HERNIA REPAIR Left 2019    Procedure: EXPLORATION OF THE ABDOMINAL WALL, REMOVAL OF 2.8CM LIPOMA;  Surgeon: Walter Alvarado MD;  Location:  [facility];  Service: General   • WISDOM TOOTH EXTRACTION           Current Outpatient Medications:   •  levETIRAcetam (KEPPRA) 500 MG tablet, Take 4 tablets by mouth 2 (Two) Times a Day., Disp: 240 tablet, Rfl: 5  •  Perampanel (Fycompa) 8 MG tablet, Take 1 tablet by mouth every night at bedtime., Disp: 30 tablet, Rfl: 1  •  buprenorphine-naloxone (SUBOXONE) 8-2 MG per SL tablet, , Disp: , Rfl:   •  diclofenac (VOLTAREN) 50 MG EC tablet, , Disp: , Rfl:   •  HYDROcodone-acetaminophen (LORTAB) 5-325 MG per tablet, Take 1 tablet by mouth Every 6 (Six) Hours As Needed for Moderate Pain ., Disp: 20 tablet, Rfl: 0  •  ibuprofen (ADVIL,MOTRIN) 800 MG tablet, Take 1 tablet by mouth Every 8 (Eight) Hours As Needed for Moderate Pain  (pain)., Disp: 30 tablet, Rfl: 0  •  oxyCODONE-acetaminophen (PERCOCET) 5-325 MG per tablet, Take 1 tablet by mouth Every 4 (Four) Hours As Needed for Moderate Pain  for up to 1 dose., Disp: 20 tablet, Rfl: 0    Allergies   Allergen Reactions   • Sulfa Antibiotics Hives       Social History     Tobacco Use   • Smoking status: Current Every Day Smoker     Packs/day: 0.25     Years: 10.00     Pack years: 2.50     Types: Cigarettes   • Smokeless tobacco: Never Used   Substance Use Topics   •  "Alcohol use: No   • Drug use: Yes     Types: Methamphetamines     Comment: history of drug use- last use 12 monthsago       Family History   Problem Relation Age of Onset   • Cervical cancer Mother    • Cervical cancer Maternal Grandmother    • Diabetes Maternal Grandmother    • Brain cancer Other    • Diabetes Other    • Heart failure Other    • Diabetes Other    • Heart failure Other    • Other Sister         SAB   • Heart disease Sister    • Migraines Sister    • Breast cancer Neg Hx    • Ovarian cancer Neg Hx    • Colon cancer Neg Hx    • Malig Hyperthermia Neg Hx        Review of Systems   Constitutional: Negative for appetite change, chills, fatigue, fever and unexpected weight change.   Gastrointestinal: Negative for abdominal distention, abdominal pain, anal bleeding, blood in stool, constipation, diarrhea, nausea and vomiting.   Genitourinary: Positive for menstrual problem and pelvic pain. Negative for dyspareunia, dysuria, vaginal bleeding, vaginal discharge and vaginal pain.       /70   Ht 157.5 cm (62\")   Wt 68 kg (150 lb)   LMP 05/21/2021   BMI 27.44 kg/m²     Physical Exam  Vitals reviewed.   Constitutional:       Appearance: She is well-developed.   HENT:      Mouth/Throat:      Dentition: Normal dentition. No dental caries.   Cardiovascular:      Rate and Rhythm: Normal rate and regular rhythm.      Heart sounds: Normal heart sounds.   Pulmonary:      Effort: Pulmonary effort is normal. No respiratory distress.      Breath sounds: Normal breath sounds. No stridor. No wheezing.   Chest:      Breasts:         Right: No inverted nipple, mass, nipple discharge, skin change or tenderness.         Left: No inverted nipple, mass, nipple discharge, skin change or tenderness.   Abdominal:      General: There is no distension.      Palpations: Abdomen is soft. There is no mass.      Tenderness: There is no abdominal tenderness.   Genitourinary:     Labia:         Right: No rash, tenderness or " lesion.         Left: No rash, tenderness or lesion.       Vagina: No vaginal discharge, tenderness or bleeding.      Cervix: No cervical motion tenderness, discharge or friability.      Uterus: Not deviated, not enlarged, not fixed and not tender.       Adnexa:         Right: No mass, tenderness or fullness.          Left: No mass, tenderness or fullness.        Comments: No pain noted on PE  Musculoskeletal:         General: No tenderness. Normal range of motion.   Skin:     General: Skin is warm.      Findings: No erythema or rash.   Neurological:      Mental Status: She is alert and oriented to person, place, and time.      Cranial Nerves: No cranial nerve deficit.      Coordination: Coordination normal.   Psychiatric:         Behavior: Behavior normal.         Thought Content: Thought content normal.         Judgment: Judgment normal.            Assessment/Plan    1) GYN HM: Check pap smear.   SBE demonstrated and encouraged.  2) STD screening: check GCCT  3) Contraception: none. Interested in permanent sterilization: . Procedure reviewed and pt desires bilateral salpingectomy. Risks, benefits and alternatives of the procedure were discussed, including , but not limited to: infection, bleeding, transfusion, injury to adjacent structures including bowel, bladder and ureters, reoperation, recurrent symptoms, thromboembolic events, aneasthesic complications and death. Pre/intra/postop course was reviewed and all questions answered. Patient was encouraged to call for any additional questions she might have in the future. Will schedule.  4) Family Planning: - pt does not have custody of any of her children  5) Diet and Exercise discussed  6) Smoking Status: .5ppd. Cindy ANA Guerin  reports that she has been smoking cigarettes. She has a 2.50 pack-year smoking history. She has never used smokeless tobacco.. I have educated her on the risk of diseases from using tobacco products such as cancer, COPD and  heart disease. I advised her to quit and she is not willing to quit.  I spent less than 3 min minutes counseling the patient.  7) Hx of drug abuse and has Hepatitis C and Hepatitis B  8) RLQ pain/AUB: Check PRL and TSH. TVUS ordered today and reveals: EM lining 6mm. Right ovarian complex cyst measures 3.3 x 2.5cm. Normal left ovary.   9) Follow up prn and 1 year       Diagnoses and all orders for this visit:    Pap smear, low-risk  -     IGP,CtNgTv,rfx Aptima HPV ASCU    Screening for genitourinary condition  -     POC Urinalysis Dipstick  -     POC Pregnancy, Urine    Abnormal uterine bleeding (AUB)  -     Prolactin  -     TSH Rfx On Abnormal To Free T4  -     HCG, Serum, Qualitative    Request for sterilization  -     sodium chloride 0.9 % flush 1-10 mL  -     sodium chloride 0.9 % flush 3 mL  -     sodium chloride 0.9 % infusion 40 mL  -     Case Request; Standing  -     Case Request    Routine gynecological examination  -     IGP,CtNgTv,rfx Aptima HPV ASCU    Special screening examination for human papillomavirus (HPV)  -     IGP,CtNgTv,rfx Aptima HPV ASCU    Other orders  -     Follow Anesthesia Guidelines / Protocol; Future  -     Chlorhexidine Skin Prep; Future  -     Follow Anesthesia Guidelines / Protocol; Standing  -     Obtain informed consent; Standing  -     Verify / Perform Chlorhexidine Skin Prep; Standing  -     Insert Peripheral IV; Standing  -     Saline Lock & Maintain IV Access; Standing  -     Verify NPO Status; Standing  -     hCG, Serum, Qualitative; Standing          Monica Cardona DO  6/14/2021  13:29 EDT

## 2021-06-11 NOTE — PROGRESS NOTES
"PROBLEM VISIT    Chief Complaint: AUB      Cindy Guerin is a 21 y.o. patient who presents with complaints of AUB. Pt reports that her cycles have become increasingly irregular for the last several months with her cycles coming sooner. She had her last cycle on May 1st. She is reporting RLQ pain. She is having unprotected intercourse. She is - she does not have custody of her 4 children. She is not interested in pregnancy. Pt is interested in starting contraception. She is interested in permanent sterilization. She states she is finished with child bearing.   Chief Complaint   Patient presents with   • Menstrual Problem             The following portions of the patient's history were reviewed and updated as appropriate: allergies, current medications and problem list.    Review of Systems   Constitutional: Negative for appetite change, chills, fatigue, fever and unexpected weight change.   Eyes: Negative for visual disturbance.   Gastrointestinal: Negative for abdominal distention, abdominal pain, anal bleeding, blood in stool, constipation, diarrhea, nausea and vomiting.   Genitourinary: Positive for menstrual problem and pelvic pain. Negative for dyspareunia, dysuria, vaginal bleeding, vaginal discharge and vaginal pain.   Neurological: Negative for headaches.       /70   Ht 157.5 cm (62\")   Wt 68 kg (150 lb)   LMP 2021   BMI 27.44 kg/m²     Physical Exam      Assessment/Plan   Diagnoses and all orders for this visit:    1. Abnormal uterine bleeding (AUB) (Primary)  -     Prolactin  -     TSH Rfx On Abnormal To Free T4  -     HCG, Serum, Qualitative    2. Screening for genitourinary condition  -     POC Urinalysis Dipstick  -     POC Pregnancy, Urine                   No follow-ups on file.      Monica Cardona DO    2021  10:02 EDT  "

## 2021-06-12 LAB
B-HCG SERPL QL: NEGATIVE
PROLACTIN SERPL-MCNC: 6.3 NG/ML (ref 4.8–23.3)
TSH SERPL DL<=0.005 MIU/L-ACNC: 2.02 UIU/ML (ref 0.27–4.2)

## 2021-06-14 ENCOUNTER — TELEPHONE (OUTPATIENT)
Dept: OBSTETRICS AND GYNECOLOGY | Facility: CLINIC | Age: 21
End: 2021-06-14

## 2021-06-14 NOTE — TELEPHONE ENCOUNTER
Would you mind calling and asking pt if she got her menstrual cycle and if she wants me to prescribe OCPs prior to her tubal surgery?

## 2021-06-15 LAB
C TRACH RRNA CVX QL NAA+PROBE: POSITIVE
CONV .: ABNORMAL
CYTOLOGIST CVX/VAG CYTO: ABNORMAL
CYTOLOGY CVX/VAG DOC CYTO: ABNORMAL
CYTOLOGY CVX/VAG DOC THIN PREP: ABNORMAL
DX ICD CODE: ABNORMAL
HIV 1 & 2 AB SER-IMP: ABNORMAL
N GONORRHOEA RRNA CVX QL NAA+PROBE: NEGATIVE
OTHER STN SPEC: ABNORMAL
STAT OF ADQ CVX/VAG CYTO-IMP: ABNORMAL
T VAGINALIS RRNA SPEC QL NAA+PROBE: NEGATIVE

## 2021-06-16 RX ORDER — AZITHROMYCIN 500 MG/1
1000 TABLET, FILM COATED ORAL DAILY
Qty: 2 TABLET | Refills: 0 | Status: SHIPPED | OUTPATIENT
Start: 2021-06-16 | End: 2021-06-17

## 2021-07-12 ENCOUNTER — PRE-ADMISSION TESTING (OUTPATIENT)
Dept: PREADMISSION TESTING | Facility: HOSPITAL | Age: 21
End: 2021-07-12

## 2021-07-12 ENCOUNTER — TELEPHONE (OUTPATIENT)
Dept: NEUROLOGY | Facility: CLINIC | Age: 21
End: 2021-07-12

## 2021-07-12 VITALS
HEART RATE: 77 BPM | OXYGEN SATURATION: 99 % | WEIGHT: 151.9 LBS | HEIGHT: 59 IN | SYSTOLIC BLOOD PRESSURE: 109 MMHG | RESPIRATION RATE: 16 BRPM | DIASTOLIC BLOOD PRESSURE: 56 MMHG | BODY MASS INDEX: 30.62 KG/M2

## 2021-07-12 LAB
ANION GAP SERPL CALCULATED.3IONS-SCNC: 7.1 MMOL/L (ref 5–15)
BUN SERPL-MCNC: 7 MG/DL (ref 6–20)
BUN/CREAT SERPL: 11.9 (ref 7–25)
CALCIUM SPEC-SCNC: 8.8 MG/DL (ref 8.6–10.5)
CHLORIDE SERPL-SCNC: 108 MMOL/L (ref 98–107)
CO2 SERPL-SCNC: 24.9 MMOL/L (ref 22–29)
CREAT SERPL-MCNC: 0.59 MG/DL (ref 0.57–1)
DEPRECATED RDW RBC AUTO: 41.7 FL (ref 37–54)
ERYTHROCYTE [DISTWIDTH] IN BLOOD BY AUTOMATED COUNT: 12.4 % (ref 12.3–15.4)
GFR SERPL CREATININE-BSD FRML MDRD: 127 ML/MIN/1.73
GLUCOSE SERPL-MCNC: 101 MG/DL (ref 65–99)
HCG SERPL QL: NEGATIVE
HCT VFR BLD AUTO: 39 % (ref 34–46.6)
HGB BLD-MCNC: 12.4 G/DL (ref 12–15.9)
MCH RBC QN AUTO: 28.9 PG (ref 26.6–33)
MCHC RBC AUTO-ENTMCNC: 31.8 G/DL (ref 31.5–35.7)
MCV RBC AUTO: 90.9 FL (ref 79–97)
PLATELET # BLD AUTO: 316 10*3/MM3 (ref 140–450)
PMV BLD AUTO: 10.2 FL (ref 6–12)
POTASSIUM SERPL-SCNC: 4.4 MMOL/L (ref 3.5–5.2)
RBC # BLD AUTO: 4.29 10*6/MM3 (ref 3.77–5.28)
SARS-COV-2 RNA PNL SPEC NAA+PROBE: NOT DETECTED
SODIUM SERPL-SCNC: 140 MMOL/L (ref 136–145)
WBC # BLD AUTO: 10.12 10*3/MM3 (ref 3.4–10.8)

## 2021-07-12 PROCEDURE — C9803 HOPD COVID-19 SPEC COLLECT: HCPCS

## 2021-07-12 PROCEDURE — 87635 SARS-COV-2 COVID-19 AMP PRB: CPT | Performed by: OBSTETRICS & GYNECOLOGY

## 2021-07-12 PROCEDURE — 85027 COMPLETE CBC AUTOMATED: CPT | Performed by: OBSTETRICS & GYNECOLOGY

## 2021-07-12 PROCEDURE — 80048 BASIC METABOLIC PNL TOTAL CA: CPT | Performed by: OBSTETRICS & GYNECOLOGY

## 2021-07-12 PROCEDURE — 36415 COLL VENOUS BLD VENIPUNCTURE: CPT

## 2021-07-12 PROCEDURE — 84703 CHORIONIC GONADOTROPIN ASSAY: CPT | Performed by: OBSTETRICS & GYNECOLOGY

## 2021-07-12 NOTE — PAT
Patient here for PAT visit, labs complete, pre-op instructions reviewed, verbalizes understanding, covid test complete.

## 2021-07-12 NOTE — TELEPHONE ENCOUNTER
Called the pharmacy as per cover my meds a PA is not needed. They said it was an error on the insurance data that was input into their system not a PA that was needed. Medication was ran and went through, they are getting ready for patient to  and will contact patient.

## 2021-07-12 NOTE — DISCHARGE INSTRUCTIONS
TO STOP CLEARS/GATORADE @ 4:30 AM    PRE-ADMISSION TESTING INSTRUCTIONS FOR ADULTS    Take these medications the morning of surgery with a small sip of water: KEPPRA      No aspirin, advil, aleve, ibuprofen, naproxen, diet pills, decongestants, or herbal/vitamins for a week prior to surgery.    General Instructions:    • Do not eat solid food after midnight the night before surgery.  No gum, mints, or hard candy after midnight the night before surgery.  • You may drink clear liquids the day of surgery up until 2 hours before your arrival time.  • Clear liquids are liquids you can see through. Nothing RED in color.    Plain water    Sports drinks  Sodas     Gelatin (Jell-O)  Fruit juices without pulp such as white grape juice and apple juice  Popsicles that contain no fruit or yogurt  Tea or coffee (no cream or milk added)    • It is beneficial for you to have a clear drink that contains carbohydrates just before you leave your house and before your fasting time begins.  We suggest a 20 ounce bottle of Gatorade or Powerade for non-diabetic patients or a 20 ounce bottle of G2 or Powerade Zero for diabetic patients.     • Patients who avoid smoking, chewing tobacco and alcohol for 4 weeks prior to surgery have a reduced risk of post-operative complications.  If at all possible, quit smoking as many days before surgery as you can.    • Do not smoke, use chewing tobacco or drink alcohol the day of surgery    • Bring your C-PAP/ BI-PAP machine if you use one.  • Wear clean comfortable clothes and socks.  • Do not wear contact lenses, lotion, deodorant, or make-up.  Bring a case for your glasses if applicable. You may brush your teeth the morning of surgery.  • You may wear dentures/partials, do not put adhesive/glue on them.  • Bring crutches or walker if applicable.  • Leave all other jewelry and valuables at home.      Preventing a Surgical Site Infection:    • Shower the night before and on the morning of surgery using  the chlorhexidine soap you were given.  Use a clean washcloth with the soap.  Place clean sheets on your bed after showering the night before surgery. Do not use the CHG soap on your hair, face, or private areas. Wash your body gently for five (5) minutes. Do not scrub your skin.  Dry with a clean towel and dress in clean clothing.    • Do not shave the surgical area for 10 days-2 weeks prior to surgery  because the razor can irritate skin and make it easier to develop an infection.  • Make sure you, your family, and all healthcare providers clean their hands with soap and water or an alcohol based hand  before caring for you or your wound.      Day of surgery:    Your surgeon’s office will advise you of your arrival time for the day of surgery.    Upon arrival, a Pre-op nurse and Anesthesia provider will review your health history, obtain vital signs, and answer questions you may have.  The only belongings needed at this time will be your home medications and if applicable your C-PAP/BI-PAP machine.  If you are staying overnight your family can leave the rest of your belongings in the car and bring them to your room later.  A Pre-op nurse will start an IV and you may receive medication in preparation for surgery, including something to help you relax.  Your family will be able to see you in the Pre-op area.  While you are in surgery your family should notify the waiting room  if they leave the waiting room area and provide a contact phone number.    IF you have any questions, you can call the Pre-Admission Department at [phone] or your surgeon's office.  Notify your surgeon if  you become sick, have a fever, productive cough, or cannot be here the day of surgery    Please be aware that surgery does come with discomfort.  We want to make every effort to control your discomfort so please discuss any uncontrolled symptoms with your nurse.   Your doctor will most likely have prescribed pain  medications.      If you are going home after surgery, you will receive individualized written care instructions before being discharged.  A responsible adult (over the age of 18) must drive you to and from the hospital on the day of your surgery and stay with you for 24 hours after anesthesia.    If you are staying overnight following surgery, you will be transported to your hospital room following the recovery period.  [facility] has all private rooms.    You may receive a survey regarding the care you received. Your feedback is very important and will be used to collect the necessary data to help us to continue to provide excellent care.     Deductibles and co-payments are collected on the day of service. Please be prepared to pay the required co-pay, deductible or deposit on the day of service as defined by your plan.

## 2021-07-12 NOTE — TELEPHONE ENCOUNTER
Provider: SERGE  Caller: PT  Relationship to Patient: SELF  Pharm[facility]: [facility]  Phone Number: [phone]  Reason for Call: PT NEEDS PRE AUTH FOR HER FYCOMPA 8 MG RX.    PLEASE CALL & ADVISE.    THANK YOU.

## 2021-07-13 ENCOUNTER — ANESTHESIA EVENT (OUTPATIENT)
Dept: PERIOP | Facility: HOSPITAL | Age: 21
End: 2021-07-13

## 2021-07-14 ENCOUNTER — HOSPITAL ENCOUNTER (OUTPATIENT)
Facility: HOSPITAL | Age: 21
Setting detail: HOSPITAL OUTPATIENT SURGERY
Discharge: HOME | End: 2021-07-14
Attending: OBSTETRICS & GYNECOLOGY | Admitting: OBSTETRICS & GYNECOLOGY

## 2021-07-14 ENCOUNTER — ANESTHESIA (OUTPATIENT)
Dept: PERIOP | Facility: HOSPITAL | Age: 21
End: 2021-07-14

## 2021-07-14 VITALS
HEART RATE: 57 BPM | RESPIRATION RATE: 14 BRPM | WEIGHT: 148 LBS | DIASTOLIC BLOOD PRESSURE: 56 MMHG | BODY MASS INDEX: 29.84 KG/M2 | HEIGHT: 59 IN | SYSTOLIC BLOOD PRESSURE: 103 MMHG | OXYGEN SATURATION: 100 % | TEMPERATURE: 98.3 F

## 2021-07-14 DIAGNOSIS — Z30.2 REQUEST FOR STERILIZATION: ICD-10-CM

## 2021-07-14 DIAGNOSIS — G89.18 POST-OP PAIN: Primary | ICD-10-CM

## 2021-07-14 PROCEDURE — 25010000002 NEOSTIGMINE 10 MG/10ML SOLUTION: Performed by: REGISTERED NURSE

## 2021-07-14 PROCEDURE — 58661 LAPAROSCOPY REMOVE ADNEXA: CPT | Performed by: SPECIALIST/TECHNOLOGIST, OTHER

## 2021-07-14 PROCEDURE — 25010000002 FENTANYL CITRATE (PF) 50 MCG/ML SOLUTION: Performed by: REGISTERED NURSE

## 2021-07-14 PROCEDURE — 88302 TISSUE EXAM BY PATHOLOGIST: CPT | Performed by: OBSTETRICS & GYNECOLOGY

## 2021-07-14 PROCEDURE — 25010000002 MIDAZOLAM PER 1MG: Performed by: REGISTERED NURSE

## 2021-07-14 PROCEDURE — 25010000002 DEXAMETHASONE PER 1 MG: Performed by: REGISTERED NURSE

## 2021-07-14 PROCEDURE — 25010000002 PROPOFOL 10 MG/ML EMULSION: Performed by: REGISTERED NURSE

## 2021-07-14 PROCEDURE — 25010000002 ONDANSETRON PER 1 MG: Performed by: REGISTERED NURSE

## 2021-07-14 PROCEDURE — 25010000002 HYDROMORPHONE PER 4 MG: Performed by: REGISTERED NURSE

## 2021-07-14 PROCEDURE — 58661 LAPAROSCOPY REMOVE ADNEXA: CPT | Performed by: OBSTETRICS & GYNECOLOGY

## 2021-07-14 RX ORDER — SODIUM CHLORIDE 0.9 % (FLUSH) 0.9 %
10 SYRINGE (ML) INJECTION EVERY 12 HOURS SCHEDULED
Status: DISCONTINUED | OUTPATIENT
Start: 2021-07-14 | End: 2021-07-14 | Stop reason: HOSPADM

## 2021-07-14 RX ORDER — OXYCODONE HYDROCHLORIDE AND ACETAMINOPHEN 5; 325 MG/1; MG/1
TABLET ORAL
Qty: 20 TABLET | Refills: 0 | Status: SHIPPED | OUTPATIENT
Start: 2021-07-14

## 2021-07-14 RX ORDER — PROPOFOL 10 MG/ML
VIAL (ML) INTRAVENOUS AS NEEDED
Status: DISCONTINUED | OUTPATIENT
Start: 2021-07-14 | End: 2021-07-14 | Stop reason: SURG

## 2021-07-14 RX ORDER — FAMOTIDINE 10 MG/ML
20 INJECTION, SOLUTION INTRAVENOUS
Status: COMPLETED | OUTPATIENT
Start: 2021-07-14 | End: 2021-07-14

## 2021-07-14 RX ORDER — SODIUM CHLORIDE 0.9 % (FLUSH) 0.9 %
10 SYRINGE (ML) INJECTION AS NEEDED
Status: DISCONTINUED | OUTPATIENT
Start: 2021-07-14 | End: 2021-07-14 | Stop reason: HOSPADM

## 2021-07-14 RX ORDER — HYDROMORPHONE HYDROCHLORIDE 1 MG/ML
0.5 INJECTION, SOLUTION INTRAMUSCULAR; INTRAVENOUS; SUBCUTANEOUS
Status: DISCONTINUED | OUTPATIENT
Start: 2021-07-14 | End: 2021-07-14 | Stop reason: HOSPADM

## 2021-07-14 RX ORDER — LIDOCAINE HYDROCHLORIDE 10 MG/ML
0.5 INJECTION, SOLUTION EPIDURAL; INFILTRATION; INTRACAUDAL; PERINEURAL ONCE AS NEEDED
Status: DISCONTINUED | OUTPATIENT
Start: 2021-07-14 | End: 2021-07-14 | Stop reason: HOSPADM

## 2021-07-14 RX ORDER — DEXAMETHASONE SODIUM PHOSPHATE 4 MG/ML
8 INJECTION, SOLUTION INTRA-ARTICULAR; INTRALESIONAL; INTRAMUSCULAR; INTRAVENOUS; SOFT TISSUE ONCE AS NEEDED
Status: COMPLETED | OUTPATIENT
Start: 2021-07-14 | End: 2021-07-14

## 2021-07-14 RX ORDER — ONDANSETRON 2 MG/ML
4 INJECTION INTRAMUSCULAR; INTRAVENOUS ONCE AS NEEDED
Status: COMPLETED | OUTPATIENT
Start: 2021-07-14 | End: 2021-07-14

## 2021-07-14 RX ORDER — GLYCOPYRROLATE 0.2 MG/ML
INJECTION INTRAMUSCULAR; INTRAVENOUS AS NEEDED
Status: DISCONTINUED | OUTPATIENT
Start: 2021-07-14 | End: 2021-07-14 | Stop reason: SURG

## 2021-07-14 RX ORDER — SODIUM CHLORIDE, SODIUM LACTATE, POTASSIUM CHLORIDE, CALCIUM CHLORIDE 600; 310; 30; 20 MG/100ML; MG/100ML; MG/100ML; MG/100ML
9 INJECTION, SOLUTION INTRAVENOUS CONTINUOUS
Status: DISCONTINUED | OUTPATIENT
Start: 2021-07-14 | End: 2021-07-14 | Stop reason: HOSPADM

## 2021-07-14 RX ORDER — FENTANYL CITRATE 50 UG/ML
INJECTION, SOLUTION INTRAMUSCULAR; INTRAVENOUS AS NEEDED
Status: DISCONTINUED | OUTPATIENT
Start: 2021-07-14 | End: 2021-07-14 | Stop reason: SURG

## 2021-07-14 RX ORDER — SODIUM CHLORIDE, SODIUM LACTATE, POTASSIUM CHLORIDE, CALCIUM CHLORIDE 600; 310; 30; 20 MG/100ML; MG/100ML; MG/100ML; MG/100ML
100 INJECTION, SOLUTION INTRAVENOUS CONTINUOUS
Status: DISCONTINUED | OUTPATIENT
Start: 2021-07-14 | End: 2021-07-14 | Stop reason: HOSPADM

## 2021-07-14 RX ORDER — FENTANYL CITRATE 50 UG/ML
25 INJECTION, SOLUTION INTRAMUSCULAR; INTRAVENOUS
Status: DISCONTINUED | OUTPATIENT
Start: 2021-07-14 | End: 2021-07-14 | Stop reason: HOSPADM

## 2021-07-14 RX ORDER — MIDAZOLAM HYDROCHLORIDE 2 MG/2ML
2 INJECTION, SOLUTION INTRAMUSCULAR; INTRAVENOUS
Status: DISCONTINUED | OUTPATIENT
Start: 2021-07-14 | End: 2021-07-14 | Stop reason: HOSPADM

## 2021-07-14 RX ORDER — DEXMEDETOMIDINE HYDROCHLORIDE 100 UG/ML
INJECTION, SOLUTION INTRAVENOUS AS NEEDED
Status: DISCONTINUED | OUTPATIENT
Start: 2021-07-14 | End: 2021-07-14 | Stop reason: SURG

## 2021-07-14 RX ORDER — SODIUM CHLORIDE 0.9 % (FLUSH) 0.9 %
3 SYRINGE (ML) INJECTION EVERY 12 HOURS SCHEDULED
Status: DISCONTINUED | OUTPATIENT
Start: 2021-07-14 | End: 2021-07-14 | Stop reason: HOSPADM

## 2021-07-14 RX ORDER — KETAMINE HYDROCHLORIDE 100 MG/ML
INJECTION INTRAMUSCULAR; INTRAVENOUS AS NEEDED
Status: DISCONTINUED | OUTPATIENT
Start: 2021-07-14 | End: 2021-07-14 | Stop reason: SURG

## 2021-07-14 RX ORDER — LIDOCAINE HYDROCHLORIDE 20 MG/ML
INJECTION, SOLUTION INFILTRATION; PERINEURAL AS NEEDED
Status: DISCONTINUED | OUTPATIENT
Start: 2021-07-14 | End: 2021-07-14 | Stop reason: SURG

## 2021-07-14 RX ORDER — OXYCODONE AND ACETAMINOPHEN 7.5; 325 MG/1; MG/1
1 TABLET ORAL ONCE AS NEEDED
Status: COMPLETED | OUTPATIENT
Start: 2021-07-14 | End: 2021-07-14

## 2021-07-14 RX ORDER — SODIUM CHLORIDE 0.9 % (FLUSH) 0.9 %
1-10 SYRINGE (ML) INJECTION AS NEEDED
Status: DISCONTINUED | OUTPATIENT
Start: 2021-07-14 | End: 2021-07-14 | Stop reason: HOSPADM

## 2021-07-14 RX ORDER — ONDANSETRON 2 MG/ML
4 INJECTION INTRAMUSCULAR; INTRAVENOUS ONCE AS NEEDED
Status: DISCONTINUED | OUTPATIENT
Start: 2021-07-14 | End: 2021-07-14 | Stop reason: HOSPADM

## 2021-07-14 RX ORDER — ROCURONIUM BROMIDE 10 MG/ML
INJECTION, SOLUTION INTRAVENOUS AS NEEDED
Status: DISCONTINUED | OUTPATIENT
Start: 2021-07-14 | End: 2021-07-14 | Stop reason: SURG

## 2021-07-14 RX ORDER — MAGNESIUM HYDROXIDE 1200 MG/15ML
LIQUID ORAL AS NEEDED
Status: DISCONTINUED | OUTPATIENT
Start: 2021-07-14 | End: 2021-07-14 | Stop reason: HOSPADM

## 2021-07-14 RX ORDER — SODIUM CHLORIDE 9 MG/ML
40 INJECTION, SOLUTION INTRAVENOUS AS NEEDED
Status: DISCONTINUED | OUTPATIENT
Start: 2021-07-14 | End: 2021-07-14 | Stop reason: HOSPADM

## 2021-07-14 RX ORDER — NEOSTIGMINE METHYLSULFATE 1 MG/ML
INJECTION, SOLUTION INTRAVENOUS AS NEEDED
Status: DISCONTINUED | OUTPATIENT
Start: 2021-07-14 | End: 2021-07-14 | Stop reason: SURG

## 2021-07-14 RX ADMIN — KETAMINE HYDROCHLORIDE 10 MG: 100 INJECTION INTRAMUSCULAR; INTRAVENOUS at 08:15

## 2021-07-14 RX ADMIN — HYDROMORPHONE HYDROCHLORIDE 0.5 MG: 1 INJECTION, SOLUTION INTRAMUSCULAR; INTRAVENOUS; SUBCUTANEOUS at 09:17

## 2021-07-14 RX ADMIN — NEOSTIGMINE METHYLSULFATE 3 MG: 1 INJECTION INTRAVENOUS at 08:35

## 2021-07-14 RX ADMIN — GLYCOPYRROLATE 0.8 MG: 0.2 INJECTION INTRAMUSCULAR; INTRAVENOUS at 08:35

## 2021-07-14 RX ADMIN — FENTANYL CITRATE 25 MCG: 50 INJECTION, SOLUTION INTRAMUSCULAR; INTRAVENOUS at 09:37

## 2021-07-14 RX ADMIN — OXYCODONE AND ACETAMINOPHEN 1 TABLET: 7.5; 325 TABLET ORAL at 10:21

## 2021-07-14 RX ADMIN — PROPOFOL 200 MG: 10 INJECTION, EMULSION INTRAVENOUS at 08:02

## 2021-07-14 RX ADMIN — LIDOCAINE HYDROCHLORIDE 50 MG: 20 INJECTION, SOLUTION INFILTRATION; PERINEURAL at 08:02

## 2021-07-14 RX ADMIN — SODIUM CHLORIDE, POTASSIUM CHLORIDE, SODIUM LACTATE AND CALCIUM CHLORIDE 9 ML/HR: 600; 310; 30; 20 INJECTION, SOLUTION INTRAVENOUS at 07:40

## 2021-07-14 RX ADMIN — HYDROMORPHONE HYDROCHLORIDE 0.5 MG: 1 INJECTION, SOLUTION INTRAMUSCULAR; INTRAVENOUS; SUBCUTANEOUS at 09:27

## 2021-07-14 RX ADMIN — ONDANSETRON 4 MG: 2 INJECTION INTRAMUSCULAR; INTRAVENOUS at 07:40

## 2021-07-14 RX ADMIN — ROCURONIUM BROMIDE 35 MG: 10 INJECTION INTRAVENOUS at 08:02

## 2021-07-14 RX ADMIN — KETAMINE HYDROCHLORIDE 10 MG: 100 INJECTION INTRAMUSCULAR; INTRAVENOUS at 08:02

## 2021-07-14 RX ADMIN — DEXAMETHASONE SODIUM PHOSPHATE 8 MG: 4 INJECTION, SOLUTION INTRAMUSCULAR; INTRAVENOUS at 07:40

## 2021-07-14 RX ADMIN — DEXMEDETOMIDINE 4 MCG: 100 INJECTION, SOLUTION, CONCENTRATE INTRAVENOUS at 08:15

## 2021-07-14 RX ADMIN — MIDAZOLAM HYDROCHLORIDE 2 MG: 1 INJECTION, SOLUTION INTRAMUSCULAR; INTRAVENOUS at 07:55

## 2021-07-14 RX ADMIN — FENTANYL CITRATE 25 MCG: 50 INJECTION INTRAMUSCULAR; INTRAVENOUS at 08:02

## 2021-07-14 RX ADMIN — DEXMEDETOMIDINE 4 MCG: 100 INJECTION, SOLUTION, CONCENTRATE INTRAVENOUS at 08:02

## 2021-07-14 RX ADMIN — FAMOTIDINE 20 MG: 10 INJECTION INTRAVENOUS at 07:40

## 2021-07-14 RX ADMIN — FENTANYL CITRATE 75 MCG: 50 INJECTION INTRAMUSCULAR; INTRAVENOUS at 08:30

## 2021-07-14 NOTE — ANESTHESIA POSTPROCEDURE EVALUATION
Patient: Cindy Guerin    Procedure Summary     Date: 07/14/21 Room / Location:  [facility]    Anesthesia Start: 0801 Anesthesia Stop: 0853    Procedure: Bilateral SALPINGECTOMY LAPAROSCOPIC (Bilateral Abdomen) Diagnosis:       Request for sterilization      (Request for sterilization [Z30.2])    Surgeons: Monica Cardona DO Provider: Pradeep Luis CRNA    Anesthesia Type: general ASA Status: 2          Anesthesia Type: general    Vitals  Vitals Value Taken Time   /54 07/14/21 0950   Temp 98.2 °F (36.8 °C) 07/14/21 0848   Pulse 85 07/14/21 0950   Resp 14 07/14/21 0950   SpO2 96 % 07/14/21 0950           Post Anesthesia Care and Evaluation    Patient location during evaluation: PHASE II  Patient participation: complete - patient participated  Level of consciousness: awake and alert  Pain score: 0  Pain management: satisfactory to patient  Airway patency: patent  Anesthetic complications: No anesthetic complications  PONV Status: none  Cardiovascular status: acceptable  Respiratory status: acceptable  Hydration status: acceptable

## 2021-07-14 NOTE — ANESTHESIA PROCEDURE NOTES
Airway  Date/Time: 7/14/2021 8:03 AM  Airway not difficult    General Information and Staff    Patient location during procedure: OR  CRNA: Pradeep Luis CRNA    Indications and Patient Condition  Indications for airway management: airway protection    Preoxygenated: yes  MILS maintained throughout  Mask difficulty assessment: 1 - vent by mask    Final Airway Details  Final airway type: endotracheal airway      Successful airway: ETT  Cuffed: yes   Successful intubation technique: direct laryngoscopy  Endotracheal tube insertion site: oral  Blade: Renteria  Blade size: 2  ETT size (mm): 7.5  Cormack-Lehane Classification: grade I - full view of glottis  Placement verified by: chest auscultation and capnometry   Cuff volume (mL): 8  Measured from: lips  ETT/EBT  to lips (cm): 23  Number of attempts at approach: 1  Assessment: lips, teeth, and gum same as pre-op and atraumatic intubation

## 2021-07-14 NOTE — OP NOTE
Operative Note    Date of Service:  21  Time of Service:  09:07 EDT    Surgical Staff: Surgeon(s) and Role:     * Monica Cardona, DO - Primary   Additional Staff: Rangel Sullivan   Pre-operative diagnosis(es): Pre-Op Diagnosis Codes:     * Request for sterilization [Z30.2]     Post-operative diagnosis(es): Post-Op Diagnosis Codes:     * Request for sterilization [Z30.2]   Procedure(s): Procedure(s):  Bilateral SALPINGECTOMY LAPAROSCOPIC     Antibiotics: none ordered on call to OR     Anesthesia: Type: General  ASA:  II     Objective      Operative findings: Normal appearing uterus, fallopian tubes and ovaries   Specimens removed: ID Type Source Tests Collected by Time   A :  Tissue Fallopian Tube, Left TISSUE PATHOLOGY EXAM Monica Cardona, DO 2021   B :  Tissue Fallopian Tube, Right TISSUE PATHOLOGY EXAM Monica Cardona, DO 2021      Fluid Intake: 700mL   Output: Documented Output  Est. Blood Loss 1mL  Urine Output 50mL      I/O this shift:  In: 700 [I.V.:700]  Out: 50 [Urine:50]     Blood products used: No   Drains: * No LDAs found *   Implant Information: Nothing was implanted during the procedure   Complications: None apaprent   Intraoperative consult(s):    Condition: stable   Disposition: to PACU and then admit to  home         Assessment/Plan     22yo  presents for permanent sterilization. Pt has had a complicated PMH/PSH with hx of Hep C and Hep B and hx of drug abuse. She does not have custody of her 4 children. She is sexually active and does not want any more children. She has requested permanent sterilization. Options for sterilization reviewed and pt desires to proceed with laparoscopic bilateral salpingectomy. Pt is aware that future children would only be possible with IVF.    After informed consent was obtained and all questions were answered, the patient is taken the operating room and placed under general anesthesia.  She was carefully placed in the dorsolithotomy  position and prepped and draped in the normal sterile fashion.  A small infraumbilical incision was made and a 5 mm trocar was placed without difficulty.  The abdomen was insufflated with CO2 gas and the patient was placed in Trendelenburg position.  Under direct visualization a 5 mm trochars placed in the right lower quadrant and an 8 mm trocar in the left lower quadrant.  Survey of the patient's pelvis was made revealing normal uterus, tubes and ovaries.  The patient's left fallopian tube was grasped.  The fallopian tube was followed out to the fimbriated end.  Using the harmonic scalpel, a left salpingectomy was performed without difficulty.  The left fallopian tube was then brought out through the 8 mm trocar and sent to pathology.  In the same fashion, the patient's right fallopian tube was grasped and followed out to the fimbriated end.  Using the harmonic scalpel, a right salpingectomy was performed.  The right fallopian tube was also brought out through the 8 mm trocar and sent to pathology.  Hemostasis was noted at the surgical sites.  At this point the procedure was deemed over.  All instrument removed from the patient's abdomen.  The CO2 gas was released.  The skin incisions were reapproximated with 4-0 Vicryl in a sub-particular fashion.  Patient tolerated the procedure well.  There were no apparent complications.  Patient is currently in stable condition in postanesthesia recovery.    Assistant: Rangel Sullivan CSA was responsible for performing the following activities: Suturing, Closing, Placing Dressing and Held/Positioned Camera and their skilled assistance was necessary for the success of this case.

## 2021-07-14 NOTE — ANESTHESIA PREPROCEDURE EVALUATION
Anesthesia Evaluation     Patient summary reviewed and Nursing notes reviewed   NPO Solid Status: > 8 hours  NPO Liquid Status: > 8 hours           Airway   Mallampati: II  TM distance: >3 FB  Neck ROM: full  No difficulty expected  Dental    (+) edentulous    Pulmonary - normal exam   (+) a smoker Current Abstained day of surgery,   Cardiovascular - normal exam  Exercise tolerance: good (4-7 METS)    ECG reviewed      ROS comment: Comparison: not compared with previous ECG   Previous ECG: no previous ECG available   Rhythm: sinus rhythm   ST Flattening: all     2019 ECHO    · Mild mitral valve regurgitation is present  · Mild tricuspid valve regurgitation is present.  · Calculated EF = 56.0%.  · There is no evidence of pericardial effusion    No ECG evidence of myocardial ischemia.Negative clinical evidence of myocardial ischemia. Findings consistent with a normal ECG stress test.    Neuro/Psych  (+) seizures,       ROS Comment: Last seizure 2 months.  GI/Hepatic/Renal/Endo    (+)   hepatitis,   (-) liver disease    Musculoskeletal     (+) back pain,   Abdominal  - normal exam   Substance History - negative use     OB/GYN    (-)  Pregnant        Other - negative ROS                     Anesthesia Plan    ASA 2     general     intravenous induction     Anesthetic plan, all risks, benefits, and alternatives have been provided, discussed and informed consent has been obtained with: patient.  Use of blood products discussed with patient  Consented to blood products.

## 2021-07-14 NOTE — H&P
Patient Care Team:  Shankar Renteria DO as PCP - General (Family Medicine)  Dalia Guillen APRN as Referring Physician (Obstetrics and Gynecology)  Francis Reaves MD as Consulting Physician (Hematology and Oncology)  Fawn Berry MD as Consulting Physician (Cardiology)    Chief complaint request sterilization       HPI: 22yo  presents for permanent sterilization. Pt has had a complicated PMH/PSH with hx of Hep C and Hep B and hx of drug abuse. She does not have custody of her 4 children. She is sexually active and does not want any more children. She has requested permanent sterilization. Options for sterilization reviewed and pt desires to proceed with laparoscopic bilateral salpingectomy. Pt is aware that future children would only be possible with IVF.    Debilities: None    Emotional Behavior: Appropriate    PMH:   Past Medical History:   Diagnosis Date   • Abdominal pain, RLQ     sched diag lap   • Anemia    • Back pain     LOWER   • Hepatitis B    • Hepatitis C    • History of heart murmur in childhood    • History of kidney stones    • HSV-1 infection 2017   • Ovarian cyst    • Request for sterilization     SCHEDULED FOR SX   • Seizures (CMS/HCC)     seizure 4/2 caused by lack of sleep per neuro, follows w/Dr Concepcion/DR. AKUA VALENTINE         PSH:   Past Surgical History:   Procedure Laterality Date   • ADENOIDECTOMY     •  SECTION      Twins 36 weeks   • CHOLECYSTECTOMY N/A 5/3/2019    Procedure: CHOLECYSTECTOMY LAPAROSCOPIC, repair incision  hernia;  Surgeon: Walter Alvarado MD;  Location: [facility] OR;  Service: General   • CHOLECYSTECTOMY      LAP   • DIAGNOSTIC LAPAROSCOPY N/A 2019    Procedure: DIAGNOSTIC LAPAROSCOPY with right ovarian cystectomy;  Surgeon: Monica Cardona DO;  Location: [facility] OR;  Service: Obstetrics/Gynecology   • HERNIA REPAIR     • OTHER SURGICAL HISTORY Left     THUMB SURGERY   • TONSILLECTOMY     • VENTRAL/INCISIONAL HERNIA REPAIR  Left 2019    Procedure: EXPLORATION OF THE ABDOMINAL WALL, REMOVAL OF 2.8CM LIPOMA;  Surgeon: Walter Alvarado MD;  Location: [facility];  Service: General   • WISDOM TOOTH EXTRACTION         SoHx:   Social History     Socioeconomic History   • Marital status: Single     Spouse name: Not on file   • Number of children: 4   • Years of education: 12   • Highest education level: Not on file   Tobacco Use   • Smoking status: Current Every Day Smoker     Packs/day: 0.25     Years: 10.00     Pack years: 2.50     Types: Cigarettes   • Smokeless tobacco: Never Used   Vaping Use   • Vaping Use: Never used   Substance and Sexual Activity   • Alcohol use: No   • Drug use: Yes     Types: Methamphetamines     Comment: history of drug use- last use    • Sexual activity: Defer     Partners: Male     Birth control/protection: None, Implant       FHx:   Family History   Problem Relation Age of Onset   • Cervical cancer Mother    • Cervical cancer Maternal Grandmother    • Diabetes Maternal Grandmother    • Brain cancer Other    • Diabetes Other    • Heart failure Other    • Diabetes Other    • Heart failure Other    • Other Sister         SAB   • Heart disease Sister    • Migraines Sister    • Breast cancer Neg Hx    • Ovarian cancer Neg Hx    • Colon cancer Neg Hx    • Malig Hyperthermia Neg Hx        PGyn Hx: LPS 21 +CT    POBHx:     Allergies: Sulfa antibiotics    Medications:   No current facility-administered medications on file prior to encounter.     Current Outpatient Medications on File Prior to Encounter   Medication Sig Dispense Refill   • levETIRAcetam (KEPPRA) 500 MG tablet Take 4 tablets by mouth 2 (Two) Times a Day. 240 tablet 5   • buprenorphine-naloxone (SUBOXONE) 8-2 MG per SL tablet      • diclofenac (VOLTAREN) 50 MG EC tablet      • HYDROcodone-acetaminophen (LORTAB) 5-325 MG per tablet Take 1 tablet by mouth Every 6 (Six) Hours As Needed for Moderate Pain . 20 tablet 0   • ibuprofen  (ADVIL,MOTRIN) 800 MG tablet Take 1 tablet by mouth Every 8 (Eight) Hours As Needed for Moderate Pain  (pain). 30 tablet 0   • oxyCODONE-acetaminophen (PERCOCET) 5-325 MG per tablet Take 1 tablet by mouth Every 4 (Four) Hours As Needed for Moderate Pain  for up to 1 dose. 20 tablet 0   • Perampanel (Fycompa) 8 MG tablet Take 1 tablet by mouth every night at bedtime. 30 tablet 1             Vital Signs  Temp:  [98.5 °F (36.9 °C)] 98.5 °F (36.9 °C)  Heart Rate:  [80] 80  Resp:  [14] 14  BP: (109)/(74) 109/74    Physical Exam:  General: NAD  Heart:: RRR  Lungs: clear  Abdomen: soft, Nt/ND  Genitalia: defered to OR  Extremities: no calf tenderness  Psychological: AAOx3      Labs: HCG neg      Assessment/Plan: 22yo  requests permanent sterilization. Proceed with laparoscopic bilateral salpingectomy.        I discussed the patients findings and my recommendations with patient and family.     Monica Cardona DO  21  07:59 EDT

## 2021-07-15 LAB
LAB AP CASE REPORT: NORMAL
PATH REPORT.FINAL DX SPEC: NORMAL
PATH REPORT.GROSS SPEC: NORMAL

## 2021-07-22 ENCOUNTER — OFFICE VISIT (OUTPATIENT)
Dept: OBSTETRICS AND GYNECOLOGY | Facility: CLINIC | Age: 21
End: 2021-07-22

## 2021-07-22 VITALS
SYSTOLIC BLOOD PRESSURE: 112 MMHG | WEIGHT: 153.6 LBS | DIASTOLIC BLOOD PRESSURE: 64 MMHG | HEIGHT: 59 IN | BODY MASS INDEX: 30.96 KG/M2

## 2021-07-22 DIAGNOSIS — Z09 POSTOPERATIVE FOLLOW-UP: ICD-10-CM

## 2021-07-22 DIAGNOSIS — Z11.3 SCREENING EXAMINATION FOR STD (SEXUALLY TRANSMITTED DISEASE): Primary | ICD-10-CM

## 2021-07-22 LAB
B-HCG UR QL: NEGATIVE
BILIRUB BLD-MCNC: NEGATIVE MG/DL
CLARITY, POC: CLEAR
COLOR UR: YELLOW
GLUCOSE UR STRIP-MCNC: NEGATIVE MG/DL
INTERNAL NEGATIVE CONTROL: NEGATIVE
INTERNAL POSITIVE CONTROL: POSITIVE
KETONES UR QL: NEGATIVE
LEUKOCYTE EST, POC: NEGATIVE
Lab: 55
NITRITE UR-MCNC: NEGATIVE MG/ML
PH UR: 5 [PH] (ref 5–8)
PROT UR STRIP-MCNC: NEGATIVE MG/DL
RBC # UR STRIP: NEGATIVE /UL
SP GR UR: 1 (ref 1–1.03)
UROBILINOGEN UR QL: NORMAL

## 2021-07-22 PROCEDURE — 81025 URINE PREGNANCY TEST: CPT | Performed by: OBSTETRICS & GYNECOLOGY

## 2021-07-22 PROCEDURE — 99024 POSTOP FOLLOW-UP VISIT: CPT | Performed by: OBSTETRICS & GYNECOLOGY

## 2021-07-22 PROCEDURE — 81002 URINALYSIS NONAUTO W/O SCOPE: CPT | Performed by: OBSTETRICS & GYNECOLOGY

## 2021-07-22 RX ORDER — CEPHALEXIN 500 MG/1
CAPSULE ORAL
COMMUNITY
Start: 2021-06-28

## 2021-07-22 RX ORDER — KETOROLAC TROMETHAMINE 10 MG/1
TABLET, FILM COATED ORAL
COMMUNITY
Start: 2021-04-16

## 2021-07-22 NOTE — PROGRESS NOTES
"PROBLEM VISIT    Chief Complaint: post op.       Cindy Guerin is a 21 y.o. patient who presents for follow up after laparoscopic bilateral salpingectomy on 7/14/21. Pt reports she is having RLQ pain since surgery. She reports she has had the pain since surgery. She states the pain is sharp. She denies N/V, diarrhea or constipation.   Chief Complaint   Patient presents with   • Post-op     Right Lower Pelvic Pain             The following portions of the patient's history were reviewed and updated as appropriate: allergies, current medications and problem list.    Review of Systems   Constitutional: Negative for appetite change, chills, fatigue, fever and unexpected weight change.   Gastrointestinal: Negative for abdominal distention, abdominal pain, anal bleeding, blood in stool, constipation, diarrhea, nausea and vomiting.   Genitourinary: Positive for pelvic pain. Negative for dyspareunia, dysuria, menstrual problem, vaginal bleeding, vaginal discharge and vaginal pain.       /64   Ht 149.9 cm (59.02\")   Wt 69.7 kg (153 lb 9.6 oz)   Breastfeeding No   BMI 31.01 kg/m²     Physical Exam  Vitals reviewed.   Constitutional:       General: She is not in acute distress.     Appearance: Normal appearance. She is normal weight. She is not toxic-appearing or diaphoretic.   Abdominal:      General: There is no distension.      Palpations: Abdomen is soft. There is no mass.      Tenderness: There is no abdominal tenderness. There is no guarding.   Neurological:      General: No focal deficit present.      Mental Status: She is alert and oriented to person, place, and time. Mental status is at baseline.      Motor: No weakness.      Coordination: Coordination normal.      Gait: Gait normal.   Psychiatric:         Mood and Affect: Mood normal.         Thought Content: Thought content normal.         Judgment: Judgment normal.           Assessment/Plan   Diagnoses and all orders for this visit:    1. Screening " examination for STD (sexually transmitted disease) (Primary)  -     Chlamydia trachomatis, Neisseria gonorrhoeae, Trichomonas vaginalis, PCR - Urine, Urine, Random Void    2. Postoperative follow-up  -     POC Urinalysis Dipstick  -     POC Pregnancy, Urine    20yo s/p laparoscopic bilateral salpingectomy    1) POD# 7/14/21: Pathology benign. Intraop photos shared. Pt to call if RLQ persists. PE unremarkable.     2) Chlamydia: s/p treatment. Check CHLOE.     3) Gyn HM: LPS 6/2021.         Return in about 1 year (around 7/22/2022) for Annual physical.      Monica Cardona,     7/22/2021  13:46 EDT

## 2021-07-23 LAB
C TRACH RRNA SPEC QL NAA+PROBE: NEGATIVE
N GONORRHOEA RRNA SPEC QL NAA+PROBE: NEGATIVE
T VAGINALIS DNA SPEC QL NAA+PROBE: NEGATIVE

## 2021-07-28 ENCOUNTER — TELEPHONE (OUTPATIENT)
Dept: OBSTETRICS AND GYNECOLOGY | Facility: CLINIC | Age: 21
End: 2021-07-28

## 2021-07-28 NOTE — TELEPHONE ENCOUNTER
Pt was seen by dr. Cardona on 7/22 for rlq pain and was told to call the office if the pain persists, pt called stating she is still in pain, how should we proceed in Dr. Kevin absence?

## 2021-08-03 ENCOUNTER — OFFICE VISIT (OUTPATIENT)
Dept: OBSTETRICS AND GYNECOLOGY | Facility: CLINIC | Age: 21
End: 2021-08-03

## 2021-08-03 VITALS
DIASTOLIC BLOOD PRESSURE: 74 MMHG | BODY MASS INDEX: 31.27 KG/M2 | WEIGHT: 155.1 LBS | HEIGHT: 59 IN | SYSTOLIC BLOOD PRESSURE: 118 MMHG

## 2021-08-03 DIAGNOSIS — Z09 FOLLOW UP: Primary | ICD-10-CM

## 2021-08-03 DIAGNOSIS — F17.200 SMOKER: ICD-10-CM

## 2021-08-03 DIAGNOSIS — R10.31 RLQ ABDOMINAL PAIN: ICD-10-CM

## 2021-08-03 DIAGNOSIS — N83.201 CYST OF RIGHT OVARY: ICD-10-CM

## 2021-08-03 LAB
BILIRUB BLD-MCNC: NEGATIVE MG/DL
CLARITY, POC: CLEAR
COLOR UR: YELLOW
GLUCOSE UR STRIP-MCNC: NEGATIVE MG/DL
KETONES UR QL: NEGATIVE
LEUKOCYTE EST, POC: NEGATIVE
NITRITE UR-MCNC: NEGATIVE MG/ML
PH UR: 5 [PH] (ref 5–8)
PROT UR STRIP-MCNC: NEGATIVE MG/DL
RBC # UR STRIP: ABNORMAL /UL
SP GR UR: 1 (ref 1–1.03)
UROBILINOGEN UR QL: NORMAL

## 2021-08-03 PROCEDURE — 99214 OFFICE O/P EST MOD 30 MIN: CPT | Performed by: OBSTETRICS & GYNECOLOGY

## 2021-08-03 RX ORDER — IBUPROFEN 800 MG/1
800 TABLET ORAL EVERY 8 HOURS PRN
Qty: 30 TABLET | Refills: 0 | Status: SHIPPED | OUTPATIENT
Start: 2021-08-03 | End: 2021-08-04 | Stop reason: SDUPTHER

## 2021-08-03 NOTE — PROGRESS NOTES
"EVALUATION AND MANAGEMENT ENCOUNTER    Cindy Guerin  Patient new to examiner? Yes  New problem to examiner? Yes  Patient referred? No    -----------------------------------------------------HISTORY---------------------------------------------------    Chief Complaint:   Chief Complaint   Patient presents with   • Follow-up     Bilateral SALPINGECTOMY LAPAROSCOPIC       HPI:  Cindy Guerin is a 21 y.o.  with No LMP recorded. here for severe RLQ pain after her alba salpingectomy.    1. Location: right pelvis      2. Severity:  severe      3.  Quality:  sharp      4. Modifying factors:  none      5.  Associated signs/symptoms:  none      Pt denies:  Fever or UTI symptoms.  No abnormal bleeding    History of Present Illness     Cindy Guerin  reports that she has been smoking cigarettes. She has a 2.50 pack-year smoking history. She has never used smokeless tobacco.. I have educated her on the risk of diseases from using tobacco products such as cancer, COPD and heart disease.     I advised her to quit          ROS:  Review of Systems:    Patient reports that she is not currently experiencing any symptoms of urinary incontinence.      noTESTED FOR CHLAMYDIA?  -----------------------------------------------PHYSICAL EXAM----------------------------------------------    Vital Signs: /74   Ht 149.9 cm (59.02\")   Wt 70.4 kg (155 lb 1.6 oz)   Breastfeeding No   BMI 31.31 kg/m²    Flowsheet Rows      First Filed Value   Admission Height  149.9 cm (59.02\") Documented at 2021 1449   Admission Weight  70.4 kg (155 lb 1.6 oz) Documented at 2021 1449          Physical Exam    -----------------------------------------------MEDICAL DECISION MAKING-----------------------------        DATA Review & labs ordered:     1.   Lab Results (last 24 hours)     ** No results found for the last 24 hours. **        2.   Imaging Results (Last 24 Hours)     ** No results found for the last 24 hours. **    "     3.   ECG/EMG Results (most recent)     None        4. Old records reviewed? Yes  5. Old records ordered?  No  6. Labs ordered?: Yes:  urinalysis: clr  7. Imaging other than ultrasound ordered?: No        Reviewed with other physician? yes     8. Ultrasound ordered and reviewed? Yes  9. Diagnoses and/or chronic conditions reviewed:      Diagnoses and all orders for this visit:    1. Follow up (Primary)  -     POC Urinalysis Dipstick    2. RLQ abdominal pain    3. Smoker    4. Cyst of right ovary    Other orders  -     Discontinue: ibuprofen (ADVIL,MOTRIN) 800 MG tablet; Take 1 tablet by mouth Every 8 (Eight) Hours As Needed for Moderate Pain  (pain).  Dispense: 30 tablet; Refill: 0  -     ibuprofen (ADVIL,MOTRIN) 800 MG tablet; Take 1 tablet by mouth Every 8 (Eight) Hours As Needed for Moderate Pain  (pain).  Dispense: 30 tablet; Refill: 0    u/s today:  Ut ave  Rt ov hem cyst = 2.8 cm  Nl lt ov  No fluid    IMPRESSION/PROBLEM:      R ovarian cyst, w/ RLQ pain    (Established problem/s? No, worsening? Yes)    (New Problem/s? Yes, additional workup planned? Yes)      PLAN:     1. NSAIDs  2. rto for f/u if no improvement in 2-4 weeks.    3. Surgery and path reviewed with pt.     Pt to call for any results from testing promptly if she does not hear from us.     RTO Return if symptoms worsen or fail to improve..  Instructions and precautions given.     I spent 30+ minutes caring for Cindy on this date of service. This time includes time spent by me in the following activities: preparing for the visit, reviewing tests, obtaining and/or reviewing a separately obtained history, performing a medically appropriate examination and/or evaluation, counseling and educating the patient/family/caregiver, ordering medications, tests, or procedures, referring and communicating with other health care professionals, documenting information in the medical record, independently interpreting results and communicating that information  with the patient/family/caregiver and care coordination      Gentry Craig MD  10:31 EDT  08/05/21

## 2021-08-04 RX ORDER — IBUPROFEN 800 MG/1
800 TABLET ORAL EVERY 8 HOURS PRN
Qty: 30 TABLET | Refills: 0 | Status: SHIPPED | OUTPATIENT
Start: 2021-08-04

## 2021-09-01 ENCOUNTER — TELEPHONE (OUTPATIENT)
Dept: NEUROLOGY | Facility: CLINIC | Age: 21
End: 2021-09-01

## 2021-09-01 NOTE — TELEPHONE ENCOUNTER
Attempted to call patient to reschedule upcoming appt as it was scheduled incorrectly for 30 minutes. Patient is new to Dr. Valiente and appt needs to be 60 minutes.

## 2021-09-02 ENCOUNTER — TELEPHONE (OUTPATIENT)
Dept: NEUROLOGY | Facility: CLINIC | Age: 21
End: 2021-09-02

## 2021-09-02 NOTE — TELEPHONE ENCOUNTER
Spoke to patient's mother about appt error. She is going to have patient call to rescheduled. She can be transferred to office.

## 2021-09-07 ENCOUNTER — TELEPHONE (OUTPATIENT)
Dept: NEUROLOGY | Facility: CLINIC | Age: 21
End: 2021-09-07

## 2021-09-07 DIAGNOSIS — G40.319 INTRACTABLE GENERALIZED IDIOPATHIC EPILEPSY WITHOUT STATUS EPILEPTICUS (HCC): ICD-10-CM

## 2021-09-07 NOTE — TELEPHONE ENCOUNTER
Attempted to call patient to r/s. No answer. Left detailed msg letting patient know that she needed to call the office to get appt rescheduled asap due to time error.     Also spoke to patient's mother again who said that she did relay msg to patient but that she could not r/s appt for patient.

## 2021-09-08 ENCOUNTER — TELEPHONE (OUTPATIENT)
Dept: NEUROLOGY | Facility: CLINIC | Age: 21
End: 2021-09-08

## 2021-09-08 RX ORDER — PERAMPANEL 8 MG/1
TABLET ORAL
Qty: 30 TABLET | Refills: 5 | Status: SHIPPED | OUTPATIENT
Start: 2021-09-08 | End: 2022-03-22

## 2021-09-08 NOTE — TELEPHONE ENCOUNTER
----- Message from Yashira Castorena MA sent at 9/1/2021  9:05 AM EDT -----  Regarding: appt change  Reschedule patient appt to 1 hr slot

## 2021-09-08 NOTE — TELEPHONE ENCOUNTER
Attempted to call patient regarding appt reschedule. No answer. Left detailed msg with new appt info.

## 2022-03-02 ENCOUNTER — TELEPHONE (OUTPATIENT)
Dept: NEUROLOGY | Facility: CLINIC | Age: 22
End: 2022-03-02

## 2022-03-02 NOTE — TELEPHONE ENCOUNTER
Attempted to reach patient regarding appt request. No answer and vm has not been set up. Please transfer call to [facility].      *We will be unable to schedule pt due to number of no-shows. Pt will need to contact PCP for referral to another neurologist.

## 2022-03-02 NOTE — TELEPHONE ENCOUNTER
Caller: Cindy Guerin    Relationship to patient: Self    Best call back number: [phone]    Chief complaint: MIGRAINES, SEIZURES/ KEPPRA AND FYCOMPA NOT WORKING ANYMORE-HAVING 1-2 SEIZURES A DAY.    Type of visit: FOLLOW UP    Requested date: ANY     Additional notes: PT HAS HAD 5 NO SHOWS, 2 W/ DR. MONAHAN AND 3 W/ DR. VALENTINE.  NO DISMISSAL LETTER IN CHART.  S/W PRACTICE, THEY WILL HAVE TO LOOK INTO THIS AND SEE IF SHE CAN BE SCHEDULED.

## 2022-03-08 NOTE — TELEPHONE ENCOUNTER
Pt returned call. Pt sts she was in the emergency room for a sz and was given a 20 day supply of sz meds. Pt agrees to call PCP for a referral to new neurologist due to the amount of no-shows and cancellations. Pt verbalized understanding.

## 2022-03-21 DIAGNOSIS — G40.319 INTRACTABLE GENERALIZED IDIOPATHIC EPILEPSY WITHOUT STATUS EPILEPTICUS: ICD-10-CM

## 2022-03-22 RX ORDER — PERAMPANEL 8 MG/1
TABLET ORAL
Qty: 30 TABLET | Refills: 0 | Status: SHIPPED | OUTPATIENT
Start: 2022-03-22 | End: 2023-02-08

## 2022-03-22 NOTE — TELEPHONE ENCOUNTER
Patient has been dismissed from practice due to repeated no shows, however, we have to provide her meds for the next 30 days.    Updated Gui in chart

## 2022-07-14 NOTE — PROGRESS NOTES
7 day PO exploration of abdominal wall - states she is having some pain  She complains of some incisional pain.  The incision is intact there is no cellulitis there is mild ecchymosis.  Pathology was consistent with a lipoma and this was discussed.  I will see her back in 1 month if things are well at that point we will schedule her for a laparoscopic cholecystectomy and her abdominal wall hernia repair   Instructed to call immediately if any new distortion, blurring, decreased vision or eye pain.

## 2022-11-14 NOTE — TELEPHONE ENCOUNTER
Hypertension Medications Protocol Failed 11/14/2022 01:04 PM   Protocol Details  CMP or BMP in past 12 months    Appointment in past 6 or next 3 months    Last serum creatinine< 2.0     Last refill - 9/6/22 - #90   Last CMP - 8/20/20   Last office visit - 1/5/22  No future appointments. Pt does not have transportation to get to her appt today and will not RS for tomorrow. Was told to tell you.

## 2023-01-30 ENCOUNTER — HOSPITAL ENCOUNTER (OUTPATIENT)
Dept: GENERAL RADIOLOGY | Facility: HOSPITAL | Age: 23
Discharge: HOME | End: 2023-01-30
Admitting: NURSE PRACTITIONER
Payer: COMMERCIAL

## 2023-01-30 ENCOUNTER — TRANSCRIBE ORDERS (OUTPATIENT)
Dept: ADMINISTRATIVE | Facility: HOSPITAL | Age: 23
End: 2023-01-30
Payer: COMMERCIAL

## 2023-01-30 DIAGNOSIS — Z96.89 NEUROPACEMAKER STATUS: Primary | ICD-10-CM

## 2023-01-30 DIAGNOSIS — Z96.89 NEUROPACEMAKER STATUS: ICD-10-CM

## 2023-01-30 PROCEDURE — 71046 X-RAY EXAM CHEST 2 VIEWS: CPT

## 2023-02-07 NOTE — PROGRESS NOTES
GYN Annual Exam     CC- Here for annual exam   Chief Complaint   Patient presents with   • Gynecologic Exam       Pt new to practice? No  Pt new to me? No     Cindy Guerin is a 22 y.o.  female who presents for annual well woman exam. Patient's last menstrual period was 2023 (exact date).    Problems in addition to need for annual: pt would like to get pregnant.  Pt had     HPI: History of Present Illness    PMHX:  Patient Active Problem List   Diagnosis   • Chronic hepatitis C without hepatic coma (HCC)   • Chronic viral hepatitis B   • HSV-1 infection   • Iron deficiency anemia   • Smoker   • Ventral hernia without obstruction or gangrene   • Family history of ovarian cancer   • H/O mixed drug abuse (HCC): methamphetamine, heroin   • H/O bilateral salpingectomy   • Post-tubal ligation syndrome   • Desire for pregnancy   ; otherwise none    OB History        3    Para   2    Term   2       0    AB   0    Living   2       SAB   0    IAB   0    Ectopic   0    Molar        Multiple   0    Live Births   2                  Past Medical History:   Diagnosis Date   • Abdominal pain, RLQ     sched diag lap   • Anemia    • Back pain     LOWER   • Hepatitis B    • Hepatitis C    • History of heart murmur in childhood    • History of kidney stones    • HSV-1 infection 2017   • Ovarian cyst    • Request for sterilization     SCHEDULED FOR SX   • Seizures (HCC)     seizure / caused by lack of sleep per neuro, follows w/Dr Concepcion/DR. AKUA VALENTINE       Past Surgical History:   Procedure Laterality Date   • ADENOIDECTOMY     •  SECTION      Twins 36 weeks   • CHOLECYSTECTOMY N/A 5/3/2019    Procedure: CHOLECYSTECTOMY LAPAROSCOPIC, repair incision  hernia;  Surgeon: Walter Alvarado MD;  Location: [facility];  Service: General   • CHOLECYSTECTOMY      LAP   • DIAGNOSTIC LAPAROSCOPY N/A 2019    Procedure: DIAGNOSTIC LAPAROSCOPY with right ovarian cystectomy;  Surgeon: Brendan  Monica NICHOLAS DO;  Location: [facility] OR;  Service: Obstetrics/Gynecology   • HERNIA REPAIR     • OTHER SURGICAL HISTORY Left     THUMB SURGERY   • SALPINGECTOMY Bilateral 7/14/2021    Procedure: Bilateral SALPINGECTOMY LAPAROSCOPIC;  Surgeon: Monica Cardona DO;  Location: [facility] OR;  Service: Obstetrics/Gynecology;  Laterality: Bilateral;   • TONSILLECTOMY     • VENTRAL/INCISIONAL HERNIA REPAIR Left 4/8/2019    Procedure: EXPLORATION OF THE ABDOMINAL WALL, REMOVAL OF 2.8CM LIPOMA;  Surgeon: Walter Alvarado MD;  Location: [facility] OR;  Service: General   • WISDOM TOOTH EXTRACTION           Current Outpatient Medications:   •  Fycompa 12 MG tablet, , Disp: , Rfl:   •  levETIRAcetam (KEPPRA) 500 MG tablet, Take 4 tablets by mouth 2 (Two) Times a Day., Disp: 240 tablet, Rfl: 5  •  buprenorphine-naloxone (SUBOXONE) 8-2 MG per SL tablet, , Disp: , Rfl:   •  cephalexin (KEFLEX) 500 MG capsule, , Disp: , Rfl:   •  diclofenac (VOLTAREN) 50 MG EC tablet, , Disp: , Rfl:   •  ibuprofen (ADVIL,MOTRIN) 800 MG tablet, Take 1 tablet by mouth Every 8 (Eight) Hours As Needed for Moderate Pain  (pain)., Disp: 30 tablet, Rfl: 0  •  ketorolac (TORADOL) 10 MG tablet, , Disp: , Rfl:   •  nicotine (Nicoderm CQ) 14 MG/24HR patch, Place 1 patch on the skin as directed by provider Daily., Disp: 30 each, Rfl: 11  •  oxyCODONE-acetaminophen (Percocet) 5-325 MG per tablet, Take 1-2 tablets by mouth every 4-6 hours as needed, Disp: 20 tablet, Rfl: 0    Allergies   Allergen Reactions   • Sulfa Antibiotics Hives       Social History     Tobacco Use   • Smoking status: Every Day     Packs/day: 0.25     Years: 10.00     Pack years: 2.50     Types: Cigarettes   • Smokeless tobacco: Never   Vaping Use   • Vaping Use: Never used   Substance Use Topics   • Alcohol use: No   • Drug use: Yes     Types: Methamphetamines     Comment: history of drug use- last use 2018       Cindy Guerin  reports that she has been smoking cigarettes. She has a 2.50  "pack-year smoking history. She has never used smokeless tobacco.. I have educated her on the risk of diseases from using tobacco products such as cancer, COPD and heart disease.           Family History   Problem Relation Age of Onset   • Cervical cancer Mother    • Cervical cancer Maternal Grandmother    • Diabetes Maternal Grandmother    • Brain cancer Other    • Diabetes Other    • Heart failure Other    • Diabetes Other    • Heart failure Other    • Other Sister         SAB   • Heart disease Sister    • Migraines Sister    • Breast cancer Neg Hx    • Ovarian cancer Neg Hx    • Colon cancer Neg Hx    • Malig Hyperthermia Neg Hx        Review of Systems    Patient reports that she is not currently experiencing any symptoms of urinary incontinence.      noTESTED FOR CHLAMYDIA?    EXAM:  /82   Ht 152.4 cm (60\")   Wt 65.9 kg (145 lb 3.2 oz)   LMP 02/04/2023 (Exact Date) Comment: Twice per month  BMI 28.36 kg/m²     Labs:   Lab Results (last 24 hours)     ** No results found for the last 24 hours. **          Physical Exam  Vitals and nursing note reviewed. Exam conducted with a chaperone present.   Constitutional:       General: She is not in acute distress.     Appearance: She is well-developed. She is not diaphoretic.   HENT:      Head: Normocephalic and atraumatic.      Nose: Nose normal.   Eyes:      Extraocular Movements: Extraocular movements intact.   Cardiovascular:      Rate and Rhythm: Normal rate.   Pulmonary:      Effort: Pulmonary effort is normal.   Chest:   Breasts:     Breasts are symmetrical.      Right: Normal. No mass, nipple discharge, skin change or tenderness.      Left: Normal. No mass, nipple discharge, skin change or tenderness.   Abdominal:      General: There is no distension.      Palpations: Abdomen is soft. There is no mass.      Tenderness: There is no abdominal tenderness. There is no guarding.   Genitourinary:     General: Normal vulva.      Pubic Area: No rash.       " Vagina: Normal. No vaginal discharge.      Cervix: Normal.      Uterus: Normal.       Adnexa: Right adnexa normal and left adnexa normal.   Musculoskeletal:         General: No tenderness or deformity. Normal range of motion.      Cervical back: Normal range of motion.   Lymphadenopathy:      Upper Body:      Right upper body: No axillary adenopathy.      Left upper body: No axillary adenopathy.   Skin:     General: Skin is warm and dry.      Coloration: Skin is not pale.      Findings: No erythema or rash.   Neurological:      Mental Status: She is alert and oriented to person, place, and time.   Psychiatric:         Behavior: Behavior normal.         Thought Content: Thought content normal.         Judgment: Judgment normal.            As part of wellness and prevention, the following topics were discussed with the patient: healthy weight, substance abuse/misuse, mental health, encouraging self breast exam, and other counseling and guidance done:  Nutrition, physical activity, healthy weight, injury prevention, misuse of tobacco, alcohol and drugs, sexual behavior and STDs, contraception, dental health, mental health, immunizations breast cancer screening and exams.    I saw the patient with a face mask, gloves and eye protection  The patient herself was masked.  Social distancing was observed as appropriate. All COVID precautions observed.  Pt encouraged to receive COVID vaccine if she hadn't already done this.     Assessment     1) GYN annual well woman exam.   2) PAP done today? Yes  3) problems addressed: yes               Plan       Follow up prn or one year.    Pt instructed to call for results of any testing done today and that failure to call if she has not heard from us could result in inadequate treatment.  Pt verbalized her understanding.     Diagnoses and all orders for this visit:    1. Pap smear, low-risk (Primary)  -     IGP,CtNgTv,rfx Aptima HPV ASCU    2. Screening for genitourinary condition  -      POC Urinalysis Dipstick  -     POC Pregnancy, Urine    3. Routine gynecological examination  -     IGP,CtNgTv,rfx Aptima HPV ASCU    4. Chronic hepatitis C without hepatic coma (HCC)    5. Chronic viral hepatitis B    6. Smoker    7. Family history of ovarian cancer    8. H/O mixed drug abuse (HCC): methamphetamine, heroin    9. H/O bilateral salpingectomy  -     Ambulatory Referral to Infertility    10. Post-tubal ligation syndrome    11. Desire for pregnancy  -     Ambulatory Referral to Infertility    Other orders  -     nicotine (Nicoderm CQ) 14 MG/24HR patch; Place 1 patch on the skin as directed by provider Daily.  Dispense: 30 each; Refill: 11        RTO Return in about 1 year (around 2/8/2024) for Annual physical.    I spent 30+ minutes on the separately reported service of as above. This time is not included in the time used to support the E/M service also reported today.      Gentry Craig MD  02/08/23  14:21 EST

## 2023-02-08 ENCOUNTER — OFFICE VISIT (OUTPATIENT)
Dept: OBSTETRICS AND GYNECOLOGY | Facility: CLINIC | Age: 23
End: 2023-02-08
Payer: COMMERCIAL

## 2023-02-08 VITALS
BODY MASS INDEX: 28.51 KG/M2 | DIASTOLIC BLOOD PRESSURE: 82 MMHG | WEIGHT: 145.2 LBS | HEIGHT: 60 IN | SYSTOLIC BLOOD PRESSURE: 124 MMHG

## 2023-02-08 DIAGNOSIS — Z31.9 DESIRE FOR PREGNANCY: ICD-10-CM

## 2023-02-08 DIAGNOSIS — B18.2 CHRONIC HEPATITIS C WITHOUT HEPATIC COMA: ICD-10-CM

## 2023-02-08 DIAGNOSIS — Z13.89 SCREENING FOR GENITOURINARY CONDITION: ICD-10-CM

## 2023-02-08 DIAGNOSIS — Z80.41 FAMILY HISTORY OF OVARIAN CANCER: ICD-10-CM

## 2023-02-08 DIAGNOSIS — Z98.51 POST-TUBAL LIGATION SYNDROME: ICD-10-CM

## 2023-02-08 DIAGNOSIS — Z01.419 ROUTINE GYNECOLOGICAL EXAMINATION: ICD-10-CM

## 2023-02-08 DIAGNOSIS — F17.200 SMOKER: ICD-10-CM

## 2023-02-08 DIAGNOSIS — Z01.419 PAP SMEAR, LOW-RISK: Primary | ICD-10-CM

## 2023-02-08 DIAGNOSIS — Z90.79 H/O BILATERAL SALPINGECTOMY: ICD-10-CM

## 2023-02-08 DIAGNOSIS — F19.11: ICD-10-CM

## 2023-02-08 DIAGNOSIS — N99.89 POST-TUBAL LIGATION SYNDROME: ICD-10-CM

## 2023-02-08 DIAGNOSIS — B18.1 CHRONIC VIRAL HEPATITIS B WITHOUT DELTA AGENT AND WITHOUT COMA: ICD-10-CM

## 2023-02-08 PROBLEM — R10.31 RLQ ABDOMINAL PAIN: Status: RESOLVED | Noted: 2021-08-03 | Resolved: 2023-02-08

## 2023-02-08 PROBLEM — K80.20 SYMPTOMATIC CHOLELITHIASIS: Status: RESOLVED | Noted: 2018-03-05 | Resolved: 2023-02-08

## 2023-02-08 PROBLEM — R10.2 PELVIC PAIN: Status: RESOLVED | Noted: 2019-07-01 | Resolved: 2023-02-08

## 2023-02-08 PROBLEM — K80.50 BILIARY COLIC: Status: RESOLVED | Noted: 2018-03-05 | Resolved: 2023-02-08

## 2023-02-08 PROBLEM — K43.9 HERNIA OF ABDOMINAL WALL: Status: RESOLVED | Noted: 2019-01-22 | Resolved: 2023-02-08

## 2023-02-08 PROBLEM — R10.2 PELVIC PAIN: Status: RESOLVED | Noted: 2019-03-28 | Resolved: 2023-02-08

## 2023-02-08 PROBLEM — R10.31 RIGHT LOWER QUADRANT PAIN: Status: RESOLVED | Noted: 2019-06-25 | Resolved: 2023-02-08

## 2023-02-08 PROBLEM — F19.10 DRUG ABUSE DURING PREGNANCY: Status: RESOLVED | Noted: 2017-10-15 | Resolved: 2023-02-08

## 2023-02-08 PROBLEM — N76.0 ACUTE VAGINITIS: Status: RESOLVED | Noted: 2019-03-28 | Resolved: 2023-02-08

## 2023-02-08 PROBLEM — K80.20 CALCULUS OF GALLBLADDER WITHOUT CHOLECYSTITIS WITHOUT OBSTRUCTION: Status: RESOLVED | Noted: 2019-04-23 | Resolved: 2023-02-08

## 2023-02-08 PROBLEM — N83.201 CYST OF RIGHT OVARY: Status: RESOLVED | Noted: 2019-06-10 | Resolved: 2023-02-08

## 2023-02-08 PROBLEM — R82.5 POSITIVE URINE DRUG SCREEN: Status: RESOLVED | Noted: 2018-10-01 | Resolved: 2023-02-08

## 2023-02-08 PROBLEM — Z30.2 REQUEST FOR STERILIZATION: Status: RESOLVED | Noted: 2018-09-05 | Resolved: 2023-02-08

## 2023-02-08 PROBLEM — O99.320 DRUG ABUSE DURING PREGNANCY (HCC): Status: RESOLVED | Noted: 2017-10-15 | Resolved: 2023-02-08

## 2023-02-08 LAB
B-HCG UR QL: NEGATIVE
BILIRUB BLD-MCNC: NEGATIVE MG/DL
CLARITY, POC: CLEAR
COLOR UR: YELLOW
EXPIRATION DATE: NORMAL
GLUCOSE UR STRIP-MCNC: NEGATIVE MG/DL
INTERNAL NEGATIVE CONTROL: NORMAL
INTERNAL POSITIVE CONTROL: NORMAL
KETONES UR QL: NEGATIVE
LEUKOCYTE EST, POC: NEGATIVE
Lab: NORMAL
NITRITE UR-MCNC: NEGATIVE MG/ML
PH UR: 5 [PH] (ref 5–8)
PROT UR STRIP-MCNC: NEGATIVE MG/DL
RBC # UR STRIP: NEGATIVE /UL
SP GR UR: 1 (ref 1–1.03)
UROBILINOGEN UR QL: NORMAL

## 2023-02-08 PROCEDURE — 99395 PREV VISIT EST AGE 18-39: CPT | Performed by: OBSTETRICS & GYNECOLOGY

## 2023-02-08 PROCEDURE — 81025 URINE PREGNANCY TEST: CPT | Performed by: OBSTETRICS & GYNECOLOGY

## 2023-02-08 PROCEDURE — 3008F BODY MASS INDEX DOCD: CPT | Performed by: OBSTETRICS & GYNECOLOGY

## 2023-02-08 PROCEDURE — 99214 OFFICE O/P EST MOD 30 MIN: CPT | Performed by: OBSTETRICS & GYNECOLOGY

## 2023-02-08 RX ORDER — PERAMPANEL 12 MG/1
TABLET ORAL
COMMUNITY
Start: 2023-01-30

## 2023-02-08 RX ORDER — NICOTINE 21 MG/24HR
1 PATCH, TRANSDERMAL 24 HOURS TRANSDERMAL EVERY 24 HOURS
Qty: 30 EACH | Refills: 11 | Status: SHIPPED | OUTPATIENT
Start: 2023-02-08

## 2023-02-14 LAB
C TRACH RRNA CVX QL NAA+PROBE: NEGATIVE
CONV .: NORMAL
CYTOLOGIST CVX/VAG CYTO: NORMAL
CYTOLOGY CVX/VAG DOC CYTO: NORMAL
CYTOLOGY CVX/VAG DOC THIN PREP: NORMAL
DX ICD CODE: NORMAL
HIV 1 & 2 AB SER-IMP: NORMAL
N GONORRHOEA RRNA CVX QL NAA+PROBE: NEGATIVE
OTHER STN SPEC: NORMAL
STAT OF ADQ CVX/VAG CYTO-IMP: NORMAL
T VAGINALIS RRNA SPEC QL NAA+PROBE: NEGATIVE

## 2023-05-06 ENCOUNTER — HOSPITAL ENCOUNTER (EMERGENCY)
Facility: HOSPITAL | Age: 23
Discharge: LEFT BEFORE DISCHARGE | End: 2023-05-07
Attending: EMERGENCY MEDICINE
Payer: COMMERCIAL

## 2023-05-06 PROCEDURE — 99281 EMR DPT VST MAYX REQ PHY/QHP: CPT

## 2023-05-06 PROCEDURE — 99211 OFF/OP EST MAY X REQ PHY/QHP: CPT

## 2023-05-07 VITALS
SYSTOLIC BLOOD PRESSURE: 110 MMHG | HEART RATE: 97 BPM | TEMPERATURE: 98.5 F | DIASTOLIC BLOOD PRESSURE: 65 MMHG | BODY MASS INDEX: 30.24 KG/M2 | RESPIRATION RATE: 16 BRPM | HEIGHT: 59 IN | WEIGHT: 150 LBS | OXYGEN SATURATION: 96 %

## 2023-05-07 NOTE — ED NOTES
"Patient approached nurse's station and asked, \"Which why do I go to leave? No one has even come to see me\". Patient was told my charge nurse that there was an emergency that required all staff members. Patient stated, \"well, someone could've come in the room\". Patient exited ED with steady gait with partner. Patient in no apparent distress as she exited ED.  "

## 2023-05-07 NOTE — ED PROVIDER NOTES
Subjective   History of Present Illness    Review of Systems    Past Medical History:   Diagnosis Date   • Abdominal pain, RLQ     sched diag lap   • Anemia    • Back pain     LOWER   • Hepatitis B    • Hepatitis C    • History of heart murmur in childhood    • History of kidney stones    • HSV-1 infection 2017   • Ovarian cyst    • Request for sterilization     SCHEDULED FOR SX   • Seizures     seizure  caused by lack of sleep per neuro, follows w/Dr Concepcion/DR. AKUA VALENTINE       Allergies   Allergen Reactions   • Sulfa Antibiotics Hives       Past Surgical History:   Procedure Laterality Date   • ADENOIDECTOMY     •  SECTION      Twins 36 weeks   • CHOLECYSTECTOMY N/A 5/3/2019    Procedure: CHOLECYSTECTOMY LAPAROSCOPIC, repair incision  hernia;  Surgeon: Walter Alvarado MD;  Location: [facility] OR;  Service: General   • CHOLECYSTECTOMY      LAP   • DIAGNOSTIC LAPAROSCOPY N/A 2019    Procedure: DIAGNOSTIC LAPAROSCOPY with right ovarian cystectomy;  Surgeon: Monica Cardona DO;  Location: [facility] OR;  Service: Obstetrics/Gynecology   • HERNIA REPAIR     • OTHER SURGICAL HISTORY Left     THUMB SURGERY   • SALPINGECTOMY Bilateral 2021    Procedure: Bilateral SALPINGECTOMY LAPAROSCOPIC;  Surgeon: Monica Cardona DO;  Location: [facility] OR;  Service: Obstetrics/Gynecology;  Laterality: Bilateral;   • TONSILLECTOMY     • VENTRAL/INCISIONAL HERNIA REPAIR Left 2019    Procedure: EXPLORATION OF THE ABDOMINAL WALL, REMOVAL OF 2.8CM LIPOMA;  Surgeon: Walter Alvarado MD;  Location: [facility] OR;  Service: General   • WISDOM TOOTH EXTRACTION         Family History   Problem Relation Age of Onset   • Cervical cancer Mother    • Cervical cancer Maternal Grandmother    • Diabetes Maternal Grandmother    • Brain cancer Other    • Diabetes Other    • Heart failure Other    • Diabetes Other    • Heart failure Other    • Other Sister         SAB   • Heart disease Sister    • Migraines Sister    • Breast  cancer Neg Hx    • Ovarian cancer Neg Hx    • Colon cancer Neg Hx    • Malig Hyperthermia Neg Hx        Social History     Socioeconomic History   • Marital status: Single   • Number of children: 4   • Years of education: 12   Tobacco Use   • Smoking status: Every Day     Packs/day: 0.25     Years: 10.00     Pack years: 2.50     Types: Cigarettes   • Smokeless tobacco: Never   Vaping Use   • Vaping Use: Never used   Substance and Sexual Activity   • Alcohol use: No   • Drug use: Yes     Types: Methamphetamines     Comment: history of drug use- last use 2018   • Sexual activity: Defer     Partners: Male     Birth control/protection: None, Implant           Objective   Physical Exam    Procedures           ED Course      The patient LWBS.  No provider was involved in her care.                                     MDM    Final diagnoses:   None       ED Disposition  ED Disposition     None          No follow-up provider specified.       Medication List      No changes were made to your prescriptions during this visit.          Anupama Dinh MD  05/07/23 0135

## 2023-12-22 ENCOUNTER — TELEPHONE (OUTPATIENT)
Dept: NEUROLOGY | Facility: CLINIC | Age: 23
End: 2023-12-22
Payer: COMMERCIAL

## 2023-12-27 ENCOUNTER — OFFICE VISIT (OUTPATIENT)
Dept: NEUROLOGY | Facility: CLINIC | Age: 23
End: 2023-12-27
Payer: COMMERCIAL

## 2023-12-27 VITALS
OXYGEN SATURATION: 99 % | DIASTOLIC BLOOD PRESSURE: 78 MMHG | SYSTOLIC BLOOD PRESSURE: 110 MMHG | BODY MASS INDEX: 26.53 KG/M2 | HEIGHT: 59 IN | HEART RATE: 72 BPM | WEIGHT: 131.6 LBS

## 2023-12-27 DIAGNOSIS — G40.219 LOCALIZATION-RELATED EPILEPSY WITH COMPLEX PARTIAL SEIZURES WITH INTRACTABLE EPILEPSY: Primary | ICD-10-CM

## 2023-12-27 PROCEDURE — 1159F MED LIST DOCD IN RCRD: CPT | Performed by: PSYCHIATRY & NEUROLOGY

## 2023-12-27 PROCEDURE — 95977 ALYS CPLX CN NPGT PRGRMG: CPT | Performed by: PSYCHIATRY & NEUROLOGY

## 2023-12-27 PROCEDURE — 1160F RVW MEDS BY RX/DR IN RCRD: CPT | Performed by: PSYCHIATRY & NEUROLOGY

## 2023-12-27 PROCEDURE — 99204 OFFICE O/P NEW MOD 45 MIN: CPT | Performed by: PSYCHIATRY & NEUROLOGY

## 2023-12-27 RX ORDER — NALOXONE HYDROCHLORIDE 4 MG/.1ML
SPRAY NASAL
COMMUNITY
Start: 2023-10-19

## 2023-12-27 RX ORDER — PERAMPANEL 2 MG/1
TABLET ORAL
Qty: 7 TABLET | Refills: 0 | Status: SHIPPED | OUTPATIENT
Start: 2023-12-27

## 2023-12-27 RX ORDER — FLUOXETINE HYDROCHLORIDE 20 MG/1
20 CAPSULE ORAL DAILY
COMMUNITY

## 2023-12-27 RX ORDER — PERAMPANEL 4 MG/1
4 TABLET ORAL NIGHTLY
Qty: 30 TABLET | Refills: 3 | Status: SHIPPED | OUTPATIENT
Start: 2023-12-27

## 2023-12-27 RX ORDER — LEVETIRACETAM 1000 MG/1
2000 TABLET ORAL 2 TIMES DAILY
COMMUNITY
Start: 2023-12-09

## 2023-12-27 NOTE — PROGRESS NOTES
Notes by LPN:  Patient referred for epilepsy. She has VNS. Last seizure was last night. She is still sluggish today.          Subjective:     Dear Lizbeth, thank you for referring Mrs. Guerin for consultation.  As you know, she is a 23-year-old right-handed young lady with history of epilepsy since that age of 8.  She says that her seizures eventually subsided but then restarted again at age 17.  She was seen by a number of providers including Dr. Ray Concepcion.  Eventually, because of the refractory nature of her spells, she had a vagus nerve stimulator implanted in November 2022.  This has helped.  She is tolerating it reasonably well.  No problematic side effects.  Current medications include Keppra 2000 mg twice daily and she had been on Fycompa at 10 mg daily until released by her last neurologist after which it was not refilled.  She has had several breakthrough seizures, the most recent of which was last night.  Spells began with a ringing noise followed by bilateral hand tingling followed by vision changes then curling hands with her head turning to the left.  At this point, she may lose consciousness or she may simply have altered consciousness for a period of time.  After these events she is very tired and fatigued and wants to sleep for about 24 hours.  Patient ID: Cindy Guerin is a 23 y.o. female.    Seizures   Pertinent negatives include no confusion, no headaches, no speech difficulty, no visual disturbance, no chest pain, no nausea and no vomiting.     The following portions of the patient's history were reviewed and updated as appropriate: allergies, current medications, past family history, past medical history, past social history, past surgical history, and problem list.    Review of Systems   Constitutional:  Negative for activity change, appetite change and fatigue.   HENT:  Negative for facial swelling, trouble swallowing and voice change.    Eyes:  Negative for photophobia, pain and  visual disturbance.   Respiratory:  Negative for chest tightness, shortness of breath and wheezing.    Cardiovascular:  Negative for chest pain, palpitations and leg swelling.   Gastrointestinal:  Negative for abdominal pain, nausea and vomiting.   Endocrine: Negative for cold intolerance and heat intolerance.   Musculoskeletal:  Negative for arthralgias, back pain, gait problem, joint swelling, myalgias, neck pain and neck stiffness.   Neurological:  Positive for seizures. Negative for dizziness, tremors, syncope, facial asymmetry, speech difficulty, weakness, light-headedness, numbness and headaches.   Hematological:  Does not bruise/bleed easily.   Psychiatric/Behavioral:  Negative for agitation, behavioral problems, confusion, decreased concentration, dysphoric mood, hallucinations, self-injury, sleep disturbance and suicidal ideas. The patient is not nervous/anxious and is not hyperactive.         Objective:    Neurologic Exam  Awake alert pleasant cooperative oriented in all spheres with fluent speech and normal speech comprehension.    Cranial nerves II through XII normal and symmetric.    Motor exam reveals normal symmetric tone bulk and power throughout without drift or spasticity.  No adventitious movements.    The sensory exam reveals normal and symmetric sensation to light touch, temperature, vibration throughout.    Coordination testing reveals smooth and accurate finger-nose-finger bilaterally, normal heel-to-shin bilaterally and normal rhythmic rapid alternating movements.  Romberg testing is negative.    Tendon reflexes are 2+ and symmetric throughout including preserved ankle jerks bilaterally.  No ankle clonus.    Physical Exam    Assessment/Plan:     Diagnoses and all orders for this visit:    1. 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   Dispense: 7 tablet; Refill: 0  -     Perampanel (Fycompa) 4 MG tablet; Take 1 tablet by mouth Every Night.  Dispense: 30 tablet; Refill: 3     Intractable, complex partial epilepsy.  Prior neurology workup and notes are unavailable at this time.  However, she has a vagus nerve stimulator implanted.  She has been on multiple medications.  Currently she is on Keppra 2000 mg twice daily and was previously on Fycompa which worked well and in suppressing her seizures.  I am restarting her on Fycompa with a titration to 4 mg daily and then will titrate further depending on her response.  For now, keeping her Keppra the same and I have interrogated and reprogrammed her vagus nerve stimulator today as outlined below in the session report for higher output current, auto stimulation, and magnet output current.  All of this was discussed in detail with her.  Will see her back in about 3 months time but she is encouraged to call in the interim with any problems or questions.

## 2024-01-02 ENCOUNTER — PATIENT ROUNDING (BHMG ONLY) (OUTPATIENT)
Dept: NEUROLOGY | Facility: CLINIC | Age: 24
End: 2024-01-02
Payer: COMMERCIAL

## 2024-01-16 ENCOUNTER — TELEPHONE (OUTPATIENT)
Dept: NEUROLOGY | Facility: CLINIC | Age: 24
End: 2024-01-16
Payer: COMMERCIAL

## 2024-01-16 NOTE — TELEPHONE ENCOUNTER
Patient states you told her at her last ov she was not to work until her seizures were under control. She has a follow up with you on 03/28/24. Are you okay with a letter putting her off of work and if so, for how long?

## 2024-02-13 PROBLEM — Z31.9 DESIRE FOR PREGNANCY: Status: RESOLVED | Noted: 2023-02-08 | Resolved: 2024-02-13

## 2024-04-02 ENCOUNTER — OFFICE VISIT (OUTPATIENT)
Dept: NEUROLOGY | Facility: CLINIC | Age: 24
End: 2024-04-02
Payer: COMMERCIAL

## 2024-04-02 VITALS
BODY MASS INDEX: 30.6 KG/M2 | SYSTOLIC BLOOD PRESSURE: 110 MMHG | DIASTOLIC BLOOD PRESSURE: 80 MMHG | OXYGEN SATURATION: 97 % | WEIGHT: 151.6 LBS | HEART RATE: 79 BPM

## 2024-04-02 DIAGNOSIS — Z51.81 ENCOUNTER FOR MONITORING SUBOXONE MAINTENANCE THERAPY: Primary | ICD-10-CM

## 2024-04-02 DIAGNOSIS — G40.219 LOCALIZATION-RELATED EPILEPSY WITH COMPLEX PARTIAL SEIZURES WITH INTRACTABLE EPILEPSY: ICD-10-CM

## 2024-04-02 DIAGNOSIS — Z79.899 ENCOUNTER FOR MONITORING SUBOXONE MAINTENANCE THERAPY: Primary | ICD-10-CM

## 2024-04-02 PROCEDURE — 1159F MED LIST DOCD IN RCRD: CPT | Performed by: PSYCHIATRY & NEUROLOGY

## 2024-04-02 PROCEDURE — 99214 OFFICE O/P EST MOD 30 MIN: CPT | Performed by: PSYCHIATRY & NEUROLOGY

## 2024-04-02 PROCEDURE — 95977 ALYS CPLX CN NPGT PRGRMG: CPT | Performed by: PSYCHIATRY & NEUROLOGY

## 2024-04-02 PROCEDURE — 1160F RVW MEDS BY RX/DR IN RCRD: CPT | Performed by: PSYCHIATRY & NEUROLOGY

## 2024-04-02 RX ORDER — PERAMPANEL 8 MG/1
8 TABLET ORAL NIGHTLY
Qty: 30 TABLET | Refills: 5 | Status: SHIPPED | OUTPATIENT
Start: 2024-04-02

## 2024-04-02 NOTE — PROGRESS NOTES
Notes by MA:  Pt is here today for a follow up for seizures.      Subjective:     Patient ID: Cindy Guerin is a 23 y.o. female.    Headache    The following portions of the patient's history were reviewed and updated as appropriate: allergies, current medications, past family history, past medical history, past social history, past surgical history, and problem list.    Review of Systems   Musculoskeletal:  Negative for gait problem.   Neurological:  Positive for seizures and headaches. Negative for dizziness, tremors, syncope, facial asymmetry, speech difficulty, weakness, light-headedness and numbness.   Psychiatric/Behavioral:  Positive for agitation, confusion (forgetful), dysphoric mood (depression) and sleep disturbance. Negative for behavioral problems, decreased concentration, hallucinations, self-injury and suicidal ideas. The patient is nervous/anxious. The patient is not hyperactive.         Objective:    Neurologic Exam  Awake alert pleasant cooperative oriented in all spheres with fluent speech and normal speech comprehension.     Cranial nerves II through XII normal and symmetric.     Motor exam reveals normal symmetric tone bulk and power throughout without drift or spasticity.  No adventitious movements.     The sensory exam reveals normal and symmetric sensation to light touch, temperature, vibration throughout.     Coordination testing reveals smooth and accurate finger-nose-finger bilaterally, normal heel-to-shin bilaterally and normal rhythmic rapid alternating movements.  Romberg testing is negative.     Tendon reflexes are 2+ and symmetric throughout including preserved ankle jerks bilaterally.  No ankle clonus.  Physical Exam    Assessment/Plan:     Diagnoses and all orders for this visit:    1. Encounter for monitoring Suboxone maintenance therapy (Primary)  -     Ambulatory Referral to Pain Management    2. Localization-related epilepsy with complex partial seizures with intractable  epilepsy    Other orders  -     Perampanel (Fycompa) 8 MG tablet; Take 1 tablet by mouth Every Night.  Dispense: 30 tablet; Refill: 5       Still having a few partial seizures that she describes as zoning out spells.  Her last generalized seizure was a couple of months ago.  She is tolerating her vagus nerve stimulator settings quite well with no problematic side effects.  We restarted her Fycompa and she is doing well at 4 mg daily and she is continuing on maintenance Keppra 2000 mg twice daily.  I have interrogated and reprogrammed her vagus nerve stimulator to higher doses as outlined in detail below.  She tolerated this two-step reprogramming quite well.  I have titrated her Fycompa to 8 mg every night and will continue her Keppra unchanged.  She tolerated the VNS titration quite well.  Battery power is above 75%.  Settings are otherwise as previously programmed.    She is asking for a referral to Suboxone clinic given her substance abuse history and we will try to make a referral for her.

## 2024-07-01 ENCOUNTER — TELEPHONE (OUTPATIENT)
Dept: NEUROLOGY | Facility: CLINIC | Age: 24
End: 2024-07-01
Payer: COMMERCIAL

## 2024-12-18 ENCOUNTER — TELEPHONE (OUTPATIENT)
Dept: NEUROLOGY | Facility: CLINIC | Age: 24
End: 2024-12-18
Payer: COMMERCIAL

## 2024-12-18 NOTE — TELEPHONE ENCOUNTER
Spoke with Katey. Requesting that we mail the magnets to   ATTN: Katey in Med Room  [address] [place] [address]

## 2024-12-18 NOTE — TELEPHONE ENCOUNTER
Provider: GONSALO    Caller: KALE    Relationship to Patient: ROYA REYES    Phone Number: [phone]    Reason for Call: KALE FROM [facility] IS CALLING BECAUSE THE PT HAS LOST HER VNS MAGNET AND THEY ARE TRYING TO GET THIS REPLACED.       PLEASE CALL TO ADVISE     THANK YOU

## (undated) DEVICE — ENDOPATH XCEL BLADELESS TROCARS WITH STABILITY SLEEVES: Brand: ENDOPATH XCEL

## (undated) DEVICE — SUCTION CANISTER, 1000CC,SAFELINER: Brand: DEROYAL

## (undated) DEVICE — LAPAROSCOPIC TROCAR SLEEVE/SINGLE USE: Brand: KII® OPTICAL ACCESS SYSTEM

## (undated) DEVICE — SUT SILK 2/0 LIGAPAK LA55G

## (undated) DEVICE — DECANTER: Brand: UNBRANDED

## (undated) DEVICE — SUT VIC 2/0 CT1 36IN

## (undated) DEVICE — APPL CHLORAPREP W/TINT 26ML ORNG

## (undated) DEVICE — ENDOPATH XCEL UNIVERSAL TROCAR STABLILITY SLEEVES: Brand: ENDOPATH XCEL

## (undated) DEVICE — TP SILK DURAPORE 2 IN

## (undated) DEVICE — PATIENT RETURN ELECTRODE, SINGLE-USE, CONTACT QUALITY MONITORING, ADULT, WITH 9FT CORD, FOR PATIENTS WEIGING OVER 33LBS. (15KG): Brand: MEGADYNE

## (undated) DEVICE — GLV SURG EUDERMIC PF LTX 8 STRL

## (undated) DEVICE — TBG INSUFL W FLTR STRL

## (undated) DEVICE — SPNG GZ WOVN 4X4IN 12PLY 10/BX STRL

## (undated) DEVICE — LAG GYN LAPAROSCOPY: Brand: MEDLINE INDUSTRIES, INC.

## (undated) DEVICE — METZENBAUM SCISSOR TIP, DISPOSABLE: Brand: RENEW

## (undated) DEVICE — ENDOPOUCH RETRIEVER SPECIMEN RETRIEVAL BAGS: Brand: ENDOPOUCH RETRIEVER

## (undated) DEVICE — BNDG ADHS PLSTC 1X3IN LF

## (undated) DEVICE — UNDYED BRAIDED (POLYGLACTIN 910), SYNTHETIC ABSORBABLE SUTURE: Brand: COATED VICRYL

## (undated) DEVICE — LAPAROSCOPIC SMOKE FILTRATION SYSTEM: Brand: PALL LAPAROSHIELD® PLUS LAPAROSCOPIC SMOKE FILTRATION SYSTEM

## (undated) DEVICE — SUT ETHIB 0/0 MO6 I8IN CX45D

## (undated) DEVICE — ENDOPATH 5MM ENDOSCOPIC BLUNT TIP DISSECTORS (12 POUCHES CONTAINING 3 DISSECTORS EACH): Brand: ENDOPATH

## (undated) DEVICE — HARMONIC ACE +7 LAPAROSCOPIC SHEARS ADVANCED HEMOSTASIS 5MM DIAMETER 36CM SHAFT LENGTH  FOR USE WITH GRAY HAND PIECE ONLY: Brand: HARMONIC ACE

## (undated) DEVICE — NDL HYPO PRECISIONGLIDE REG 25G 1 1/2

## (undated) DEVICE — TROCAR: Brand: KII® SLEEVE

## (undated) DEVICE — SUT SILK 2/0 SH 30IN K833H

## (undated) DEVICE — SUT VIC 3/0 CTI 36IN J944H

## (undated) DEVICE — ENDOPATH XCEL BLUNT TIP TROCARS WITH SMOOTH SLEEVES: Brand: ENDOPATH XCEL

## (undated) DEVICE — SUT MNCRYL 4/0 PS2 18 IN

## (undated) DEVICE — DRSNG TELFA PAD NONADH STR 1S 3X8IN

## (undated) DEVICE — SYR LL TP 10ML STRL

## (undated) DEVICE — SUT PROLN 1 CTX 30IN 8455H

## (undated) DEVICE — GLV SURG SENSICARE PI MIC PF SZ6 LF STRL

## (undated) DEVICE — PK PROC MAJ 90

## (undated) DEVICE — GLV SURG SENSICARE MICRO PF LF 6 STRL

## (undated) DEVICE — PK LAP GEN 90

## (undated) DEVICE — BNDG ADHS CURAD FLX/FABRC 2X4IN STRL LF